# Patient Record
Sex: MALE | Race: WHITE | Employment: FULL TIME | URBAN - METROPOLITAN AREA
[De-identification: names, ages, dates, MRNs, and addresses within clinical notes are randomized per-mention and may not be internally consistent; named-entity substitution may affect disease eponyms.]

---

## 2017-12-08 ENCOUNTER — HOSPITAL ENCOUNTER (OUTPATIENT)
Dept: LAB | Age: 52
Discharge: HOME | End: 2017-12-08
Payer: COMMERCIAL

## 2017-12-08 DIAGNOSIS — I10 HYPERTENSION, UNSPECIFIED TYPE: ICD-10-CM

## 2017-12-08 LAB — SODIUM UR-SCNC: 101 MMOL/L

## 2017-12-08 PROCEDURE — 84300 ASSAY OF URINE SODIUM: CPT | Performed by: INTERNAL MEDICINE

## 2019-08-14 ENCOUNTER — HOSPITAL ENCOUNTER (OUTPATIENT)
Dept: LAB | Age: 54
Discharge: HOME | End: 2019-08-14

## 2019-08-14 PROCEDURE — 88305 TISSUE EXAM BY PATHOLOGIST: CPT

## 2021-01-14 ENCOUNTER — ANESTHESIA EVENT (OUTPATIENT)
Dept: SURGERY | Age: 56
End: 2021-01-14
Payer: COMMERCIAL

## 2021-01-15 ENCOUNTER — ANESTHESIA (OUTPATIENT)
Dept: SURGERY | Age: 56
End: 2021-01-15
Payer: COMMERCIAL

## 2021-01-15 ENCOUNTER — HOSPITAL ENCOUNTER (OUTPATIENT)
Age: 56
Setting detail: OUTPATIENT SURGERY
Discharge: HOME | End: 2021-01-15
Attending: ORTHOPAEDIC SURGERY | Admitting: ORTHOPAEDIC SURGERY
Payer: COMMERCIAL

## 2021-01-15 VITALS
DIASTOLIC BLOOD PRESSURE: 76 MMHG | SYSTOLIC BLOOD PRESSURE: 137 MMHG | HEART RATE: 87 BPM | OXYGEN SATURATION: 95 % | TEMPERATURE: 97.6 F | WEIGHT: 190 LBS | BODY MASS INDEX: 25.77 KG/M2 | RESPIRATION RATE: 15 BRPM

## 2021-01-15 LAB — POTASSIUM BLD-SCNC: 3 MMOL/L (ref 3.5–5.1)

## 2021-01-15 PROCEDURE — 77030010509 HC AIRWY LMA MSK TELE -A: Performed by: ANESTHESIOLOGY

## 2021-01-15 PROCEDURE — 77030003666 HC NDL SPINAL BD -A: Performed by: ORTHOPAEDIC SURGERY

## 2021-01-15 PROCEDURE — 2709999900 HC NON-CHARGEABLE SUPPLY: Performed by: ORTHOPAEDIC SURGERY

## 2021-01-15 PROCEDURE — A4565 SLINGS: HCPCS | Performed by: ORTHOPAEDIC SURGERY

## 2021-01-15 PROCEDURE — 74011250636 HC RX REV CODE- 250/636: Performed by: ANESTHESIOLOGY

## 2021-01-15 PROCEDURE — 77030019605: Performed by: ORTHOPAEDIC SURGERY

## 2021-01-15 PROCEDURE — 77030006891 HC BLD SHV RESECT STRY -B: Performed by: ORTHOPAEDIC SURGERY

## 2021-01-15 PROCEDURE — 76210000063 HC OR PH I REC FIRST 0.5 HR: Performed by: ORTHOPAEDIC SURGERY

## 2021-01-15 PROCEDURE — 74011000250 HC RX REV CODE- 250: Performed by: NURSE ANESTHETIST, CERTIFIED REGISTERED

## 2021-01-15 PROCEDURE — 74011250636 HC RX REV CODE- 250/636: Performed by: NURSE ANESTHETIST, CERTIFIED REGISTERED

## 2021-01-15 PROCEDURE — 77030040361 HC SLV COMPR DVT MDII -B: Performed by: ORTHOPAEDIC SURGERY

## 2021-01-15 PROCEDURE — 77030004453 HC BUR SHV STRY -B: Performed by: ORTHOPAEDIC SURGERY

## 2021-01-15 PROCEDURE — 74011000250 HC RX REV CODE- 250: Performed by: ANESTHESIOLOGY

## 2021-01-15 PROCEDURE — 76060000033 HC ANESTHESIA 1 TO 1.5 HR: Performed by: ORTHOPAEDIC SURGERY

## 2021-01-15 PROCEDURE — 77030011930 HC WND ARTHRO ABLT S&N -C: Performed by: ORTHOPAEDIC SURGERY

## 2021-01-15 PROCEDURE — 76010010054 HC POST OP PAIN BLOCK: Performed by: ORTHOPAEDIC SURGERY

## 2021-01-15 PROCEDURE — 74011250636 HC RX REV CODE- 250/636: Performed by: ORTHOPAEDIC SURGERY

## 2021-01-15 PROCEDURE — 77030042357 HC MANFLD KT -B: Performed by: ORTHOPAEDIC SURGERY

## 2021-01-15 PROCEDURE — 77030002933 HC SUT MCRYL J&J -A: Performed by: ORTHOPAEDIC SURGERY

## 2021-01-15 PROCEDURE — 76010000161 HC OR TIME 1 TO 1.5 HR INTENSV-TIER 1: Performed by: ORTHOPAEDIC SURGERY

## 2021-01-15 PROCEDURE — 77030003602 HC NDL NRV BLK BBMI -B: Performed by: ANESTHESIOLOGY

## 2021-01-15 PROCEDURE — 76942 ECHO GUIDE FOR BIOPSY: CPT | Performed by: ORTHOPAEDIC SURGERY

## 2021-01-15 PROCEDURE — 76210000021 HC REC RM PH II 0.5 TO 1 HR: Performed by: ORTHOPAEDIC SURGERY

## 2021-01-15 PROCEDURE — 77030039821 HC DEV SUT FIRSTPASS MIN S&N -G1: Performed by: ORTHOPAEDIC SURGERY

## 2021-01-15 PROCEDURE — 84132 ASSAY OF SERUM POTASSIUM: CPT

## 2021-01-15 RX ORDER — OXYCODONE HYDROCHLORIDE 5 MG/1
5 TABLET ORAL
Status: DISCONTINUED | OUTPATIENT
Start: 2021-01-15 | End: 2021-01-15 | Stop reason: HOSPADM

## 2021-01-15 RX ORDER — FENTANYL CITRATE 50 UG/ML
100 INJECTION, SOLUTION INTRAMUSCULAR; INTRAVENOUS ONCE
Status: COMPLETED | OUTPATIENT
Start: 2021-01-15 | End: 2021-01-15

## 2021-01-15 RX ORDER — EPHEDRINE SULFATE/0.9% NACL/PF 50 MG/5 ML
SYRINGE (ML) INTRAVENOUS AS NEEDED
Status: DISCONTINUED | OUTPATIENT
Start: 2021-01-15 | End: 2021-01-15 | Stop reason: HOSPADM

## 2021-01-15 RX ORDER — PROPOFOL 10 MG/ML
INJECTION, EMULSION INTRAVENOUS AS NEEDED
Status: DISCONTINUED | OUTPATIENT
Start: 2021-01-15 | End: 2021-01-15 | Stop reason: HOSPADM

## 2021-01-15 RX ORDER — HYDROMORPHONE HYDROCHLORIDE 2 MG/ML
0.5 INJECTION, SOLUTION INTRAMUSCULAR; INTRAVENOUS; SUBCUTANEOUS
Status: DISCONTINUED | OUTPATIENT
Start: 2021-01-15 | End: 2021-01-15 | Stop reason: HOSPADM

## 2021-01-15 RX ORDER — SODIUM CHLORIDE 0.9 % (FLUSH) 0.9 %
5-40 SYRINGE (ML) INJECTION AS NEEDED
Status: DISCONTINUED | OUTPATIENT
Start: 2021-01-15 | End: 2021-01-15 | Stop reason: HOSPADM

## 2021-01-15 RX ORDER — LIDOCAINE HYDROCHLORIDE 20 MG/ML
INJECTION, SOLUTION EPIDURAL; INFILTRATION; INTRACAUDAL; PERINEURAL AS NEEDED
Status: DISCONTINUED | OUTPATIENT
Start: 2021-01-15 | End: 2021-01-15 | Stop reason: HOSPADM

## 2021-01-15 RX ORDER — MIDAZOLAM HYDROCHLORIDE 1 MG/ML
2 INJECTION, SOLUTION INTRAMUSCULAR; INTRAVENOUS
Status: DISCONTINUED | OUTPATIENT
Start: 2021-01-15 | End: 2021-01-15 | Stop reason: HOSPADM

## 2021-01-15 RX ORDER — CEFAZOLIN SODIUM/WATER 2 G/20 ML
2 SYRINGE (ML) INTRAVENOUS ONCE
Status: COMPLETED | OUTPATIENT
Start: 2021-01-15 | End: 2021-01-15

## 2021-01-15 RX ORDER — SODIUM CHLORIDE, SODIUM LACTATE, POTASSIUM CHLORIDE, CALCIUM CHLORIDE 600; 310; 30; 20 MG/100ML; MG/100ML; MG/100ML; MG/100ML
100 INJECTION, SOLUTION INTRAVENOUS CONTINUOUS
Status: DISCONTINUED | OUTPATIENT
Start: 2021-01-15 | End: 2021-01-15 | Stop reason: HOSPADM

## 2021-01-15 RX ORDER — LIDOCAINE HYDROCHLORIDE 10 MG/ML
0.1 INJECTION INFILTRATION; PERINEURAL AS NEEDED
Status: DISCONTINUED | OUTPATIENT
Start: 2021-01-15 | End: 2021-01-15 | Stop reason: HOSPADM

## 2021-01-15 RX ORDER — ONDANSETRON 2 MG/ML
INJECTION INTRAMUSCULAR; INTRAVENOUS AS NEEDED
Status: DISCONTINUED | OUTPATIENT
Start: 2021-01-15 | End: 2021-01-15 | Stop reason: HOSPADM

## 2021-01-15 RX ORDER — NALOXONE HYDROCHLORIDE 0.4 MG/ML
0.04 INJECTION, SOLUTION INTRAMUSCULAR; INTRAVENOUS; SUBCUTANEOUS
Status: DISCONTINUED | OUTPATIENT
Start: 2021-01-15 | End: 2021-01-15 | Stop reason: HOSPADM

## 2021-01-15 RX ORDER — EPINEPHRINE 1 MG/ML
INJECTION, SOLUTION, CONCENTRATE INTRAVENOUS AS NEEDED
Status: DISCONTINUED | OUTPATIENT
Start: 2021-01-15 | End: 2021-01-15 | Stop reason: HOSPADM

## 2021-01-15 RX ORDER — MIDAZOLAM HYDROCHLORIDE 1 MG/ML
2 INJECTION, SOLUTION INTRAMUSCULAR; INTRAVENOUS ONCE
Status: COMPLETED | OUTPATIENT
Start: 2021-01-15 | End: 2021-01-15

## 2021-01-15 RX ORDER — SODIUM CHLORIDE 0.9 % (FLUSH) 0.9 %
5-40 SYRINGE (ML) INJECTION EVERY 8 HOURS
Status: DISCONTINUED | OUTPATIENT
Start: 2021-01-15 | End: 2021-01-15 | Stop reason: HOSPADM

## 2021-01-15 RX ORDER — DEXAMETHASONE SODIUM PHOSPHATE 4 MG/ML
INJECTION, SOLUTION INTRA-ARTICULAR; INTRALESIONAL; INTRAMUSCULAR; INTRAVENOUS; SOFT TISSUE AS NEEDED
Status: DISCONTINUED | OUTPATIENT
Start: 2021-01-15 | End: 2021-01-15 | Stop reason: HOSPADM

## 2021-01-15 RX ADMIN — SODIUM CHLORIDE, SODIUM LACTATE, POTASSIUM CHLORIDE, AND CALCIUM CHLORIDE 100 ML/HR: 600; 310; 30; 20 INJECTION, SOLUTION INTRAVENOUS at 08:22

## 2021-01-15 RX ADMIN — Medication 10 MG: at 09:18

## 2021-01-15 RX ADMIN — PROPOFOL 200 MG: 10 INJECTION, EMULSION INTRAVENOUS at 09:14

## 2021-01-15 RX ADMIN — Medication 2 G: at 09:05

## 2021-01-15 RX ADMIN — ROPIVACAINE HYDROCHLORIDE 35 ML: 5 INJECTION, SOLUTION EPIDURAL; INFILTRATION; PERINEURAL at 08:36

## 2021-01-15 RX ADMIN — MIDAZOLAM 2 MG: 1 INJECTION INTRAMUSCULAR; INTRAVENOUS at 08:33

## 2021-01-15 RX ADMIN — LIDOCAINE HYDROCHLORIDE 100 MG: 20 INJECTION, SOLUTION EPIDURAL; INFILTRATION; INTRACAUDAL; PERINEURAL at 09:14

## 2021-01-15 RX ADMIN — Medication 10 MG: at 09:20

## 2021-01-15 RX ADMIN — ROPIVACAINE HYDROCHLORIDE 35 ML: 5 INJECTION, SOLUTION EPIDURAL; INFILTRATION; PERINEURAL at 09:47

## 2021-01-15 RX ADMIN — FENTANYL CITRATE 100 MCG: 50 INJECTION, SOLUTION INTRAMUSCULAR; INTRAVENOUS at 08:33

## 2021-01-15 RX ADMIN — Medication 10 MG: at 09:34

## 2021-01-15 RX ADMIN — ONDANSETRON 4 MG: 2 INJECTION INTRAMUSCULAR; INTRAVENOUS at 09:55

## 2021-01-15 RX ADMIN — DEXAMETHASONE SODIUM PHOSPHATE 4 MG: 4 INJECTION, SOLUTION INTRAMUSCULAR; INTRAVENOUS at 09:53

## 2021-01-15 NOTE — DISCHARGE INSTRUCTIONS
Post-Operative Instructions   For  Shoulder Arthroscopy  Phone:  [phone]    1. Apply ice to the shoulder as needed. 2. You may shower after the arthroscopy. Keep dressings in place for the first three days and do not get them wet. After three days, you may remove the dressings and leave the steri-strip bandages in place; they will peel off naturally. 3. You may discontinue use of your sling and begin active range of motion of the elbow as tolerated by pain, unless you are instructed otherwise. Do not lift arm by your side for 4 weeks. 4. Begin therapy as ordered. 5. Use any pain medication as instructed. You should take your pain medication as soon as you feel the anesthetic wearing off. Do not wait until you are in severe pain to begin taking your pain medication. 6. You may have some side effects from your pain medication. If you have nausea, try taking your medication with food. For itching, you may take over the counter Benadryl. 7. You may have been given a prescription for Phenergan. This medication is used for nausea and vomiting. You do not need to get this prescription filled unless you have a problem. 8. If you have a problem, please call [address] [place] [address] at [phone] [address] [address]         [address] [place] [address] Call your doctor if pain is NOT relieved by medication.   Excessive bleeding that does not stop after holding pressure over the area  · Temperature of 101 degrees F or above  · Excessive redness, swelling or bruising, and/ or green or yellow, smelly discharge from incision      TYPICAL SIDE EFFECTS OF PAIN MEDICATION:     Constipation: Drink lots of fluids. Over the counter stool softener if needed.    Nausea: Take pain medication with food. Call your doctor with persistent nausea. ACTIVITY  · As tolerated and as directed by your doctor. · Bathe or shower as directed by your doctor. DIET  · Day of surgery: Clear liquids until no nausea or vomiting; small portion, light diet [place] foods (ex: baked chicken, plain rice, grits, scrambled eggs, toast). Nothing greasy, fried or spicy today. · Advance to regular diet on second day, unless your doctor orders otherwise. · If nausea and vomiting continues, call your doctor. PAIN  · Take pain medication as directed by your doctor. · DO NOT take aspirin or blood thinners unless directed by your doctor. AFTER ANESTHESIA   · For the first 24 hours: DO NOT Drive, Drink alcoholic beverages, or Make important decisions. · Be aware of dizziness following anesthesia and while taking pain medication. DISCHARGE SUMMARY from Nurse    PATIENT INSTRUCTIONS:    After general anesthesia or intravenous sedation, for 24 hours or while taking prescription Narcotics:  · Limit your activities  · Do not drive and operate hazardous machinery  · Do not make important personal or business decisions  · Do  not drink alcoholic beverages  · If you have not urinated within 8 hours after discharge, please contact your surgeon on call. *  Please give a list of your current medications to your Primary Care Provider. *  Please update this list whenever your medications are discontinued, doses are      changed, or new medications (including over-the-counter products) are added. *  Please carry medication information at all times in case of emergency situations. Preventing Infection at Home  We care about preventing infection and avoiding the spread of germs - not only when you are in the hospital but also when you return home. When you return home from the hospital, its important to take the following steps to help prevent infection and avoid spreading germs that could infect you and others. Ask everyone in your home to follow these guidelines, too.     Clean Your Hands  · Clean your hands whenever your hands are visibly dirty, before you eat, before or after touching your mouth, nose or eyes, and before preparing food. Clean them after contact with body fluids, using the restroom, touching animals or changing diapers. · When washing hands, wet them with warm water and work up a lather. Rub hands for at least 15 seconds, then rinse them and pat them dry with a clean towel or paper towel. · When using hand sanitizers, it should take about 15 seconds to rub your hands dry. If not, you probably didnt apply enough . Cover Your Sneeze or Cough  Germs are released into the air whenever you sneeze or cough. To prevent the spread of infection:  · Turn away from other people before coughing or sneezing. · Cover your mouth or nose with a tissue when you cough or sneeze. Put the tissue in the trash. · If you dont have a tissue, cough or sneeze into your upper sleeve, not your hands. · Always clean your hands after coughing or sneezing. Care for Wounds  Your skin is your bodys first line of defense against germs, but an open wound leaves an easy way for germs to enter your body. To prevent infection:  · Clean your hands before and after changing wound dressings, and wear gloves to change dressings if recommended by your doctor. · Take special care with IV lines or other devices inserted into the body. If you must touch them, clean your hands first.  · Follow any specific instructions from your doctor to care for your wounds. Contact your doctor if you experience any signs of infection, such as fever or increased redness at the surgical or wound site. Keep a Clean Home  · Clean or wipe commonly touched hard surfaces like door handles, sinks, tabletops, phones and TV remotes. · Use products labeled disinfectant to kill harmful bacteria and viruses. · Use a clean cloth or paper towel to clean and dry surfaces. Wiping surfaces with a dirty dishcloth, sponge or towel will only spread germs.   · Never share toothbrushes, danielson, drinking glasses, utensils, razor blades, face cloths or bath towels to avoid spreading germs. · Be sure that the linens that you sleep on are clean. · Keep pets away from wounds and wash your hands after touching pets, their toys or bedding. We care about you and your health. Remember, preventing infections is a team effort between you, your family, friends and health care providers. These are general instructions for a healthy lifestyle:    No smoking/ No tobacco products/ Avoid exposure to second hand smoke    Surgeon General's Warning:  Quitting smoking now greatly reduces serious risk to your health. Obesity, smoking, and sedentary lifestyle greatly increases your risk for illness    A healthy diet, regular physical exercise & weight monitoring are important for maintaining a healthy lifestyle    You may be retaining fluid if you have a history of heart failure or if you experience any of the following symptoms:  Weight gain of 3 pounds or more overnight or 5 pounds in a week, increased swelling in our hands or feet or shortness of breath while lying flat in bed. Please call your doctor as soon as you notice any of these symptoms; do not wait until your next office visit. Recognize signs and symptoms of STROKE:    F-face looks uneven    A-arms unable to move or move unevenly    S-speech slurred or non-existent    T-time-call 911 as soon as signs and symptoms begin-DO NOT go       Back to bed or wait to see if you get better-TIME IS BRAIN. Advance Care Planning  People with COVID-19 may have no symptoms, mild symptoms, such as fever, cough, and shortness of breath or they may have more severe illness, developing severe and fatal pneumonia.   As a result, Advance Care Planning with attention to naming a health care decision maker (someone you trust to make healthcare decisions for you if you could not speak for yourself) and sharing other health care preferences is important BEFORE a possible health crisis. Please contact your Primary Care Provider to discuss Advance Care Planning. Preventing the Spread of Coronavirus Disease 2019 in Homes and Residential Communities  For the most recent information go to [email or web address]    Prevention steps for People with confirmed or suspected COVID-19 (including persons under investigation) who do not need to be hospitalized  and   People with confirmed COVID-19 who were hospitalized and determined to be medically stable to go home    Your healthcare provider and public health staff will evaluate whether you can be cared for at home. If it is determined that you do not need to be hospitalized and can be isolated at home, you will be monitored by staff from your local or state health department. You should follow the prevention steps below until a healthcare provider or local or state health department says you can return to your normal activities. Stay home except to get medical care  People who are mildly ill with COVID-19 are able to isolate at home during their illness. You should restrict activities outside your home, except for getting medical care. Do not go to work, school, or public areas. Avoid using public transportation, ride-sharing, or taxis. Separate yourself from other people and animals in your home  People: As much as possible, you should stay in a specific room and away from other people in your home. Also, you should use a separate bathroom, if available. Animals: You should restrict contact with pets and other animals while you are sick with COVID-19, just like you would around other people. Although there have not been reports of pets or other animals becoming sick with COVID-19, it is still recommended that people sick with COVID-19 limit contact with animals until more information is known about the virus.  When possible, have another member of your household care for your animals while you are sick. If you are sick with COVID-19, avoid contact with your pet, including petting, snuggling, being kissed or licked, and sharing food. If you must care for your pet or be around animals while you are sick, wash your hands before and after you interact with pets and wear a facemask. Call ahead before visiting your doctor  If you have a medical appointment, call the healthcare provider and tell them that you have or may have COVID-19. This will help the healthcare providers office take steps to keep other people from getting infected or exposed. Wear a facemask  You should wear a facemask when you are around other people (e.g., sharing a room or vehicle) or pets and before you enter a healthcare providers office. If you are not able to wear a facemask (for example, because it causes trouble breathing), then people who live with you should not stay in the same room with you, or they should wear a facemask if they enter your room. Cover your coughs and sneezes  Cover your mouth and nose with a tissue when you cough or sneeze. Throw used tissues in a lined trash can. Immediately wash your hands with soap and water for at least 20 seconds or, if soap and water are not available, clean your hands with an alcohol-based hand  that contains at least 60% alcohol. Clean your hands often  Wash your hands often with soap and water for at least 20 seconds, especially after blowing your nose, coughing, or sneezing; going to the bathroom; and before eating or preparing food. If soap and water are not readily available, use an alcohol-based hand  with at least 60% alcohol, covering all surfaces of your hands and rubbing them together until they feel dry. Soap and water are the best option if hands are visibly dirty. Avoid touching your eyes, nose, and mouth with unwashed hands.   Avoid sharing personal household items  You should not share dishes, drinking glasses, cups, eating utensils, towels, or bedding with other people or pets in your home. After using these items, they should be washed thoroughly with soap and water. Clean all high-touch surfaces everyday  High touch surfaces include counters, tabletops, doorknobs, bathroom fixtures, toilets, phones, keyboards, tablets, and bedside tables. Also, clean any surfaces that may have blood, stool, or body fluids on them. Use a household cleaning spray or wipe, according to the label instructions. Labels contain instructions for safe and effective use of the cleaning product including precautions you should take when applying the product, such as wearing gloves and making sure you have good ventilation during use of the product. Monitor your symptoms  Seek prompt medical attention if your illness is worsening (e.g., difficulty breathing). Before seeking care, call your healthcare provider and tell them that you have, or are being evaluated for, COVID-19. Put on a facemask before you enter the facility. These steps will help the healthcare providers office to keep other people in the office or waiting room from getting infected or exposed. Ask your healthcare provider to call the local or [facility] health department. Persons who are placed under active monitoring or facilitated self-monitoring should follow instructions provided by their local health department or occupational health professionals, as appropriate. When working with your local health department check their available hours. If you have a medical emergency and need to call 911, notify the dispatch personnel that you have, or are being evaluated for COVID-19. If possible, put on a facemask before emergency medical services arrive. Discontinuing home isolation  Patients with confirmed COVID-19 should remain under home isolation precautions until the risk of secondary transmission to others is thought to be low.  The decision to discontinue home isolation precautions should be made on a case-by-case basis, in consultation with healthcare providers and state and local health departments.

## 2021-01-15 NOTE — ANESTHESIA PREPROCEDURE EVALUATION
Relevant Problems   No relevant active problems       Anesthetic History   No history of anesthetic complications            Review of Systems / Medical History  Pertinent labs reviewed    Pulmonary        Sleep apnea: CPAP           Neuro/Psych             Comments: hereditary sensory neuropathy- last flare summer 2020. Pt gets numbness or weakness that migrates from face, arms, and legs bilaterally.   Treated with rest. Cardiovascular    Hypertension              Exercise tolerance: >4 METS     GI/Hepatic/Renal  Within defined limits              Endo/Other        Arthritis and anemia (pernicious anemia)     Other Findings            Physical Exam    Airway  Mallampati: II  TM Distance: 4 - 6 cm  Neck ROM: normal range of motion   Mouth opening: Normal     Cardiovascular  Regular rate and rhythm,  S1 and S2 normal,  no murmur, click, rub, or gallop             Dental  No notable dental hx       Pulmonary  Breath sounds clear to auscultation               Abdominal  GI exam deferred       Other Findings            Anesthetic Plan    ASA: 2  Anesthesia type: general      Post-op pain plan if not by surgeon: peripheral nerve block single    Induction: Intravenous  Anesthetic plan and risks discussed with: Patient and Spouse

## 2021-01-15 NOTE — H&P
Outpatient Surgery History and Physical:  Jeanine Courser was seen and examined. CHIEF COMPLAINT:   LT sshoulder. PE:   There were no vitals taken for this visit. Heart:   Regular rhythm      Lungs:  Are clear      Past Medical History:    Patient Active Problem List    Diagnosis    Hereditary sensory neuropathy     [facility]      Arthritis     O. A.      Asthma    Other ill-defined conditions(699.89)     pernicious anemia-b12 shots once momthly      Lower leg DVT (deep venous thrombosis) (HCC)    Pernicious anemia       Surgical History:   Past Surgical History:   Procedure Laterality Date    HX LIPOMA RESECTION      HX ORTHOPAEDIC      knee artho.  [place] ABDOMEN SURGERY PROC UNLISTED      [facility]       Social History: Patient  reports that he has never smoked. He has never used smokeless tobacco. He reports current alcohol use. He reports that he does not use drugs. Family History:   Family History   Problem Relation Age of Onset    Hypertension Mother     Cancer Mother         kidney    Diabetes Mother     Heart Disease Mother     Seizures Mother     Heart Attack Mother         x7-8+    Cancer Father     Heart Disease Father     Heart Attack Father     Cancer Paternal Uncle        Allergies: Reviewed per EMR  No Known Allergies    Medications:    No current facility-administered medications on file prior to encounter. Current Outpatient Medications on File Prior to Encounter   Medication Sig    sildenafil citrate (VIAGRA) 25 mg tablet Take 1 Tab by mouth as needed. The surgery is planned for the shoulder. History and physical has been reviewed. The patient has been examined. There have been no significant clinical changes since the completion of the originally dated History and Physical.  Patient identified by surgeon; surgical site was confirmed by patient and surgeon. The patient is here today for outpatient surgery.  I have examined the patient, no changes are noted in the patient's medical status. Necessity for the procedure/care is still present and the history and physical above is current. See the office notes for the full long term history of the problem. Please see the recent office notes for the musculoskeletal examination. Signed By: Raymond Wills MD     January 15, 2021 7:00 AM       Outpatient Surgery History and Physical:  Brian Dugan was seen and examined. CHIEF COMPLAINT:   shulder. PE:   There were no vitals taken for this visit. Heart:   Regular rhythm      Lungs:  Are clear      Past Medical History:    Patient Active Problem List    Diagnosis    Hereditary sensory neuropathy     [facility]      Arthritis     O. A.      Asthma    Other ill-defined conditions(799.89)     pernicious anemia-b12 shots once momthly      Lower leg DVT (deep venous thrombosis) (HCC)    Pernicious anemia       Surgical History:   Past Surgical History:   Procedure Laterality Date    HX LIPOMA RESECTION      HX ORTHOPAEDIC      knee artho.  [place] ABDOMEN SURGERY PROC UNLISTED      [facility]       Social History: Patient  reports that he has never smoked. He has never used smokeless tobacco. He reports current alcohol use. He reports that he does not use drugs. Family History:   Family History   Problem Relation Age of Onset    Hypertension Mother     Cancer Mother         kidney    Diabetes Mother     Heart Disease Mother     Seizures Mother     Heart Attack Mother         x7-8+    Cancer Father     Heart Disease Father     Heart Attack Father     Cancer Paternal Uncle        Allergies: Reviewed per EMR  No Known Allergies    Medications:    No current facility-administered medications on file prior to encounter. Current Outpatient Medications on File Prior to Encounter   Medication Sig    sildenafil citrate (VIAGRA) 25 mg tablet Take 1 Tab by mouth as needed. The surgery is planned for the shoulder.         History and physical has been reviewed. The patient has been examined. There have been no significant clinical changes since the completion of the originally dated History and Physical.  Patient identified by surgeon; surgical site was confirmed by patient and surgeon. The patient is here today for outpatient surgery. I have examined the patient, no changes are noted in the patient's medical status. Necessity for the procedure/care is still present and the history and physical above is current. See the office notes for the full long term history of the problem. Please see the recent office notes for the musculoskeletal examination.     Signed By: Kerri Mac MD     January 15, 2021 7:00 AM       History and Physical

## 2021-01-15 NOTE — PERIOP NOTES
PACU DISCHARGE NOTE  Vital signs stable, pain well controlled, alert and oriented times three or at baseline, no anesthetic complications. IV removed with catheter tip intact. Written and verbal discharge instructions given, including pain control, dressing care and follow up appointment. Spouse, [facility], verbalized understanding and signed discharge instructions. All questions answered prior to discharge. Dr Geo Botello okay to discharge at this time. Pt and all belongings taken via wheelchair and safely put in vehicle.

## 2021-01-15 NOTE — ANESTHESIA PROCEDURE NOTES
Peripheral Block    Start time: 1/15/2021 9:43 AM  End time: 1/15/2021 9:47 AM  Performed by: Macie Tyler MD  Authorized by: Macie Tyler MD       Pre-procedure: Indications: at surgeon's request, post-op pain management and procedure for pain    Preanesthetic Checklist: patient identified, risks and benefits discussed, site marked, timeout performed, anesthesia consent given and patient being monitored    Timeout Time: 09:43  Preanesthetic Checklist comment:  Risk of nerve damage discussed. Block Type:   Block Type: Interscalene  Laterality:  Left  Monitoring:  Standard ASA monitoring, continuous pulse ox, frequent vital sign checks, heart rate, oxygen and responsive to questions  Injection Technique:  Single shot  Procedures: ultrasound guided and nerve stimulator    Prep: chlorhexidine    Needle Type:  Stimuplex (4 Inch)  Needle Gauge:  20 G  Needle Localization:  Ultrasound guidance and nerve stimulator  Motor Response comment:   Motor Response: minimal motor response >0.4 mA   Medication Injected:  Ropivacaine 0.375% with epi 1:200,000 in NS injection, 35 mL (Mixture components: ropivacaine (PF) 5 mg/mL (0.5 %) Soln, . 75 mL; EPINEPHrine HCl (PF) 1 mg/mL (1 mL) Soln, . 005 mL; 0.9% sodium chloride Soln, .245 mL)  Med Admin Time: 1/15/2021 9:47 AM    Assessment:  Number of attempts:  1  Injection Assessment:  Incremental injection every 5 mL, local visualized surrounding nerve on ultrasound, negative aspiration for blood, no paresthesia, no intravascular symptoms and ultrasound image on chart  Patient tolerance:  Patient tolerated the procedure well with no immediate complications  3 cc 1% lidocaine injected at site of needle insertion. Needle inserted and placed in close proximity to the nerve under real time ultrasound guidance. Ultrasound was used to visualize the spread of local anesthetic in close proximity to the nerve being blocked.   The nerve appeared anatomically normal and there were no abnormal findings.

## 2021-01-15 NOTE — OP NOTES
[facility]  OPERATIVE REPORT    Name:  Bertrand Shelton  MR#:  [id number]  :  1965  ACCOUNT #:  [de-identified]  DATE OF SERVICE:  01/15/2021    PREOPERATIVE DIAGNOSIS:  Left possible biceps tear. POSTOPERATIVE DIAGNOSES:  1. Biceps tear and superior labral tear of the left shoulder. 2.  Marked impingement of the left shoulder. 3.  Acromioclavicular joint arthritis. PROCEDURE PERFORMED:  1. Arthroscopic resection of the distal clavicle, 78490.  2.  Biceps tenotomy, 30193.  3.  Subacromial decompression, 62635. SURGEON:  Loan Lopez MD    ASSISTANT:  None. ANESTHESIA:  General.    COMPLICATIONS:  None. SPECIMENS REMOVED:  None. IMPLANTS:  None. ESTIMATED BLOOD LOSS:  Minimal.    PROCEDURE:  After an adequate level of general anesthesia was obtained, the patient had a block per Anesthesia for postop pain management and received antibiotics. He had full range of motion of the shoulder. I discussed with him in the office and the preop area the possibility of a biceps tenotomy and the sequelae. The joint was distended posteriorly with saline and the arthroscope introduced. Anterior portal was made at the soft spot. The articular surface looked good. The biceps was torn when there was marked separation of the labrum with a superior labral tear as well and a split in the biceps at the anchor. It was elected to perform a biceps tenotomy through the anterior portal and this was performed with the shaver, basket and radiofrequency. The undersurface of the rotator cuff was well visualized and he had some fraying but still good integrity. The arthroscope was then placed in the subacromial space. The patient had very marked hooking of the acromion and a decompression was performed with the shaver and the frankie removing the anterior and lateral osteophytes. He also had degenerative changes of the [facility] joint. The rotator cuff was well visualized.   It was visualize through the posterior portal and through the lateral portal.  It was probed extensively. He still had good integrity. There was some mild thinning but it still had good integrity. It was not felt that he had enough of a tear to convert to a full-thickness tear and a repair. It was debrided with the shaver. Circumferentially, the soft tissue was removed around the distal clavicle and some large osteophytes inferiorly which were removed through the lateral portal with a frankie. Then through the anterior portal, more of the superior bone was removed at the end of the distal clavicle on both sides of the joint. This provided ample room. The wound was then copiously irrigated. Steri-Strips and sterile dressings were applied. The arm was placed in a shoulder immobilizer. He tolerated the procedure well.       MD IKER [id number]  D:  01/15/2021 10:20  T:  01/15/2021 11:01  JOB #:  [id number]  CC:  [id number] [facility]

## 2021-01-15 NOTE — ANESTHESIA POSTPROCEDURE EVALUATION
Procedure(s):  LEFT SHOULDER ARTHROSCOPY DISTAL CLAVICLE RESECTION/ DECOMPRESSION/ BICEPS TENOTOMY. general    Anesthesia Post Evaluation        Patient location during evaluation: PACU  Patient participation: complete - patient participated  Level of consciousness: awake  Pain management: satisfactory to patient  Airway patency: patent  Anesthetic complications: no  Cardiovascular status: hemodynamically stable  Respiratory status: spontaneous ventilation  Hydration status: euvolemic  Post anesthesia nausea and vomiting:  none      INITIAL Post-op Vital signs:   Vitals Value Taken Time   /71 01/15/21 1045   Temp 36.3 °C (97.4 °F) 01/15/21 1032   Pulse 94 01/15/21 1047   Resp 15 01/15/21 1045   SpO2 96 % 01/15/21 1047   Vitals shown include unvalidated device data.

## 2021-01-15 NOTE — ANESTHESIA PROCEDURE NOTES
Peripheral Block    Start time: 1/15/2021 8:32 AM  End time: 1/15/2021 8:36 AM  Performed by: Rolly Eid MD  Authorized by: Rolly Eid MD       Pre-procedure: Indications: at surgeon's request, post-op pain management and procedure for pain    Preanesthetic Checklist: patient identified, risks and benefits discussed, site marked, timeout performed, anesthesia consent given and patient being monitored    Timeout Time: 08:32  Preanesthetic Checklist comment:  Risk of nerve damage discussed. Block Type:   Block Type: Interscalene  Laterality:  Left  Monitoring:  Standard ASA monitoring, continuous pulse ox, frequent vital sign checks, heart rate, oxygen and responsive to questions  Injection Technique:  Single shot  Procedures: ultrasound guided and nerve stimulator    Prep: chlorhexidine    Needle Type:  Stimuplex (4 Inch)  Needle Gauge:  20 G  Needle Localization:  Ultrasound guidance and nerve stimulator  Motor Response comment:   Motor Response: minimal motor response >0.4 mA   Medication Injected:  Ropivacaine 0.375% with epi 1:200,000 in NS injection, 35 mL (Mixture components: ropivacaine (PF) 5 mg/mL (0.5 %) Soln, . 75 mL; EPINEPHrine HCl (PF) 1 mg/mL (1 mL) Soln, . 005 mL; 0.9% sodium chloride Soln, .245 mL)  Med Admin Time: 1/15/2021 8:36 AM    Assessment:  Number of attempts:  1  Injection Assessment:  Incremental injection every 5 mL, local visualized surrounding nerve on ultrasound, negative aspiration for blood, no paresthesia, no intravascular symptoms and ultrasound image on chart  Patient tolerance:  Patient tolerated the procedure well with no immediate complications  3 cc 1% lidocaine injected at site of needle insertion. Needle inserted and placed in close proximity to the nerve under real time ultrasound guidance. Ultrasound was used to visualize the spread of local anesthetic in close proximity to the nerve being blocked.   The nerve appeared anatomically normal and there were no abnormal findings.

## 2021-01-19 NOTE — THERAPY EVALUATION
Rosanna Matter  : 1965      Payor: Soniya Otoole / Plan: [facility] / Product Type: PPO /    [facility] at [address] [place] [address]. Alverto Tavarez, [address] [place] [address]  [phone]   [phone]              OUTPATIENT PHYSICAL THERAPY:Initial Assessment: 2021    ICD-10: Treatment Diagnosis:   Pain in Left Shoulder (M25.512)  Stiffness of Left Shoulder not elsewhere specified (M25.612)  Primary Osteoarthritis of Left Shoulder (M19.012)  Strain of muscle(s) and tendon(s) of the rotator cuff of Left Shoulder, sequela (X81.050R)              Precautions/Allergies:   Patient has no known allergies. Fall Risk Score: 1 (? 5 = High Risk)  MD Orders: Eval and Treat  MEDICAL/REFERRING DIAGNOSIS:  degenerative joint disease of left acromioclavicular joint  subacromial inpingement of left shoulder  strain of muscle, fascia and tendon of other parts of biceps, left arm, initial encounder   DATE OF ONSET: 1.15.21  REFERRING PHYSICIAN: Gómez Mccray MD  RETURN PHYSICIAN APPOINTMENT: Wednesday. INITIAL ASSESSMENT:   Mr. Concha Ferrell presents to physical therapy with decreased strength, ROM, joint mobility, functional mobility. These S/S are consistent with S/P     1. Arthroscopic resection of the distal clavicle  2. Biceps tenotomy  3. Subacromial decompression    *He is very limited with ER and abduction actively as well as has increased pain with any AROM. Note: he is only 5 days post op at this time! ACTIVE ROM (standing) LEFT   Shoulder Flexion 130 deg   Shoulder Abduction 60 deg   Shoulder Internal Rotation (Apley's) L1    Shoulder External Rotation (Apley's) Top of head      Passively, he does demonstrate tightness with ER. Shown gentle isometrics first session.        Patient will benefit from skilled physical therapy for manual therapeutic techniques (as appropriate), therapeutic exercises and activities, neuromuscular re-education, and comprehensive home exercises program to address current impairments and functional limitations. Otglenn Matter will benefit from skilled PT (medically necessary) in order to address above deficits affecting participation in basic ADLs and overall functional tolerance. PROBLEM LIST (Impacting functional limitations):  · Decreased Strength  · Decreased ADL/Functional Activities  · Increased Pain  · Decreased Activity Tolerance  · Increased Shortness of Breath  · Decreased Flexibility/Joint Mobility  · Decreased [place] with Home Exercise Program INTERVENTIONS PLANNED:  1. Cold  2. Family Education  3. Home Exercise Program (HEP)  4. Manual Therapy  5. Neuromuscular Re-education/Strengthening  6. Range of Motion (ROM)  7. Therapeutic Activites  8. Therapeutic Exercise/Strengthening  9. Transfer Training  10. Ultrasound   TREATMENT PLAN:  Effective Dates: 1/20/2021 TO 4/19/2021 (90 days). Frequency/Duration: 2 times a week for 90 Days  GOALS: (Goals have been discussed and agreed upon with patient.)     Short-Term Goals~4 weeks  Goal Met   1. Otglenn Aceves will report <=1/10 pain with don/doffing clothing as well as minimal/no difficulty. 1.  [] Date:   2. Otsandhyala Matter will demonstrate improvement in active shoulder scaption/flexion to >155 degrees to increase UE function and participation in ADLs. 2.  [] Date:   3. Othella Matter will demonstrate demonstrate improvement in active shoulder abduction to >155 degrees to increase UE function and participation in ADLs. 3.  [] Date:   4. Othella Matter will show a greater than 8 point decrease on the DASH in order to show an increase in upper extremity function. 4.  [] Date:   5. Otglenn Aceves will be independent in all HEP 5.  [] Date:   6. Otglenn Matter will be able to reach backwards and put on seatbelt without difficulty. 6.  [] Date:         Long Term Goals~8 weeks Goal Met   1.  Otsandhyala Matter will show full ROM of the UE in order to return to full functional mobility  1. [] Date:   2. Savana Hardy will show a greater than 15 point decrease on the DASH in order to show an increase in upper extremity function 2. [] Date:   3. Savana Hardy will report doing hair/bathing without difficulty and <=2/10 pain in order to be independent with ADL's 3.  [] Date:   4. Savana Hardy will be independent in all advanced HEP 4.  [] Date:            Outcome Measure: Tool Used: Disabilities of the Arm, Shoulder and Hand (DASH) Questionnaire - Quick Version  Score:  Initial: 45/55  Most Recent: X/55 (Date: -- )   Interpretation of Score: The DASH is designed to measure the activities of daily living in person's with upper extremity dysfunction or pain. Each section is scored on a 1-5 scale, 5 representing the greatest disability. The scores of each section are added together for a total score of 55. Medical Necessity:   · Skilled intervention continues to be required due to deficits and impairments seen upon initial evaluation affecting patient's participation in ADLs and functional tasks. Reason for Services/Other Comments:  · Patient continues to require skilled intervention due to deficits and impairments seen upon initial evaluation affecting patient's participation in ADLs and functional tasks. Total Treatment Duration:  PT Patient Time In/Time Out  Time In: 1400  Time Out: 1500    Rehabilitation Potential For Stated Goals: good  Regarding Teto Gaston's therapy, I certify that the treatment plan above will be carried out by a therapist or under their direction. Thank you for this referral,  Poonam Victor DPT     Referring Physician Signature: Bibi Carreno MD              Date                    HISTORY:   History of Present Injury/Illness (Reason for Referral):  1.15.21 had surgery --- I did not have a brace, and don't need one. I am not working until [employer] better.   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 I started having pain in 2014 and I thought it was bursitis, but with ice and things it wouldn't get all the way better. Eventually this year I got to where I couldn't move it. I went and saw Ortho and he thought it was RTC but with MRI they found it was bicep.      -Present symptoms/complaints (on day of evaluation)  Pain Scale:  · Current: 2/10  · Best: 2/10  · Worst: 6/10    · Aggravating factors: Lifting, Carrying and Overhead activities  · Relieving factors: self medicating  · Irritability: Low (Onset of pain is is longer than the time it takes for Pain to go away)    Dominant Side:  left  Past Medical History/Comorbidities:   Mr. Rosanne Contreras  has a past medical history of Arthritis, Asthma, Hereditary sensory neuropathy, Hypertension, Lower leg DVT (deep venous thrombosis) ([facility] [address] (8/16/2009), Other ill-defined conditions(799.89), and Sleep apnea. He also has no past medical history of COPD or Unspecified adverse effect of anesthesia. Mr. Rosanne Contreras  has a past surgical history that includes pr abdomen surgery proc unlisted; hx lipoma resection; and hx orthopaedic. Social History/Living Environment:      Prior Level of Function/Work/Activity:  Normal  Other Clinical Tests:         X-RAY Negative for Fx  Current Medications:    Current Outpatient Medications:     amLODIPine (NORVASC) 10 mg tablet, TAKE 1 TABLET BY MOUTH EVERY DAY, Disp: , Rfl:     ALPRAZolam (Xanax) 0.5 mg tablet, Take  by mouth nightly as needed for Anxiety. , Disp: , Rfl:     chlorthalidone (HYGROTON) 25 mg tablet, TAKE 1 TABLET BY MOUTH EVERY MORNING WITH FOOD, Disp: , Rfl:     cholecalciferol, vitamin D3, 50 mcg (2,000 unit) tab, Take  by mouth., Disp: , Rfl:     ascorbic acid, vitamin C, (Vitamin C) 500 mg tablet, Take  by mouth., Disp: , Rfl:     multivitamin (ONE A DAY) tablet, Take 1 Tab by mouth daily. , Disp: , Rfl:         Ambulatory/Rehab Services H2 Model Falls Risk Assessment    Risk Factors:       (1)  Gender [Male] Ability to Rise from Chair:       (0)  Ability to rise in a single movement    Falls Prevention Plan:       No modifications necessary   Total: (5 or greater = High Risk): 1    ©2010 [facility] Patent #[id number]. Federal Law prohibits the replication, distribution or use without written permission from [facility] of [employer]       Date Last Reviewed:  1/20/2021   Number of Personal Factors/Comorbidities that affect the Plan of Care: 1-2: MODERATE COMPLEXITY   EXAMINATION:   Observation/Orthostatic Postural Assessment: Forward Head and Rounded Shoulders  Palpation:          Pain to anterior shoulder and incision sites.     ROM:    AROM/PROM         Joint: Eval Date: 1/20/2021  Re-Assess Date:  Re-Assess Date:    ACTIVE ROM (standing) RIGHT LEFT RIGHT LEFT RIGHT LEFT   Shoulder Flexion 180  130 deg           Shoulder Abduction 180 deg with twinge 60 deg           Shoulder Internal Rotation (Apley's) T5 L1            Shoulder External Rotation (Apley's) T3 Top of head            Elbow ROM             PASSIVE ROM (supine)             Shoulder Flexion  160 deg            Shoulder Abduction  153 deg            Shoulder Internal Rotation  70 deg           Shoulder External Rotation  30 deg  [facility]                                               Strength:    Joint: Eval Date: 1/20/2021  Re-Assess Date:  Re-Assess Date:     RIGHT LEFT RIGHT LEFT RIGHT LEFT   Shoulder Flexion 5/5 3+/5           Shoulder Abduction  (C5) 5/5 4-/5           Shoulder Internal Rotation 5/5 4/5           Shoulder External Rotation 5/5 3+/5           Elbow Flexion  (C6) 5/5 4-/5           Elbow Extension (C7) 5/5 4+/5           Wrist Flexion (C7) 5/5 5/5           Wrist Extension (C6)             Resisted Thumb Extension/Finger Abduction (C8/T1)          Resisted Cervical Rotation (C1):             Resisted Shoulder Shrug (C2, 3, 4):               Strength                                                        Functional Mobility:  Assessed @ Initial Visit  -- Limited OH movement. Body Structures Involved:  1. Bones  2. Joints  3. Muscles  4. Ligaments Body Functions Affected:  1. Sensory/Pain  2. Neuromusculoskeletal  3. Movement Related Activities and Participation Affected:  1. Mobility  2. Self Care   Number of elements that affect the Plan of Care: 4+: HIGH COMPLEXITY   CLINICAL PRESENTATION:   Presentation: Stable and uncomplicated: LOW COMPLEXITY   CLINICAL DECISION MAKING:      Use of outcome tool(s) and clinical judgement create a POC that gives a: Clear prediction of patient's progress: LOW COMPLEXITY     See associated treatment note for treatment provided today. No future appointments.       SEPIDEH MckeonT

## 2021-01-19 NOTE — PROGRESS NOTES
Bryant Zaidimp  : 1965  Payor: Kristi Found / Plan: [facility] / Product Type: PPO /  Amanda Fling at [address] [facility]. [address] [place] [address], [facility], [address] [place] [address]  [phone]   [phone]                                                          Henrry Brooke MD      OUTPATIENT PHYSICAL THERAPY: Daily Treatment Note 2021 Visit Count:  1    Tx Diagnosis:    Pain in Left Shoulder (M25.512)  Stiffness of Left Shoulder not elsewhere specified (M25.612)  Primary Osteoarthritis of Left Shoulder (M19.012)  Strain of muscle(s) and tendon(s) of the rotator cuff of Left Shoulder, sequela (S46.012S)           Pre-treatment Symptoms/Complaints:  See Initial Eval Dated 21 for more details. Current ROM:   ACTIVE ROM (standing) RIGHT LEFT   Shoulder Flexion 180  130 deg   Shoulder Abduction 180 deg with twinge 60 deg   Shoulder Internal Rotation (Apley's) T5 L1    Shoulder External Rotation (Apley's) T3 Top of head    Elbow ROM     PASSIVE ROM (supine)     Shoulder Flexion  160 deg    Shoulder Abduction  153 deg    Shoulder Internal Rotation  70 deg   Shoulder External Rotation  30 deg          Pain: Initial:2/10 Post Session: 1/10   Medications Last Reviewed:  2021     Updated Objective Findings: See Initial Eval for more details. TREATMENT:   THERAPEUTIC EXERCISE: (15 minutes):  Exercises per grid below to improve mobility, strength and balance. Required minimal visual, verbal and manual cues to promote proper body alignment and promote proper body posture. Progressed resistance and complexity of movement as indicated.      Date:  2021 Date:   Date:     Activity/Exercise Parameters Parameters Parameters   Education HEP, POC, PT goals, anatomy/pathology     Isometrics: ER, IR, Flexion, Abduction 10x10\"     UBE      Wand flexion/abduction      Wall slides                        MANUAL THERAPY: (10 minutes): Joint mobilization, Soft tissue mobilization was utilized and necessary because of the patient's restricted joint motion and restricted motion of soft tissue mobility. Date  1/20/2021    Technique Used Grade  Level # Time(s) Effect while being performed   PROM    L all planes   STM incision sites                                                [employer] Portal  Treatment/Session Summary:    Response to Treatment: Pt demonstrated understanding of POC and initial HEP. No increase in pain or adverse reactions. Communication/Consultation:  POC, HEP, PT goals, Faxed initial evaluation to MD.   Equipment provided today: HEP Handout     Recommendations/Intent for next treatment session:   Next visit will focus on Pain Science Education RTC strengthening soft tissue mobilization. Treatment Plan of Care Effective Dates: 1/20/2021 TO 4/19/2021 (90 days). Frequency/Duration: 2 times a week for 90 Days             Total Treatment Billable Duration:   25  Rx plus Eval   PT Patient Time In/Time Out  Time In: 1400  Time Out: [facility] Isabella Graham DPT    No future appointments.

## 2021-01-20 ENCOUNTER — HOSPITAL ENCOUNTER (OUTPATIENT)
Dept: PHYSICAL THERAPY | Age: 56
Discharge: HOME | End: 2021-01-20
Payer: COMMERCIAL

## 2021-01-20 PROCEDURE — 97110 THERAPEUTIC EXERCISES: CPT

## 2021-01-20 PROCEDURE — 97161 PT EVAL LOW COMPLEX 20 MIN: CPT

## 2021-01-20 PROCEDURE — 97140 MANUAL THERAPY 1/> REGIONS: CPT

## 2021-01-26 ENCOUNTER — HOSPITAL ENCOUNTER (OUTPATIENT)
Dept: PHYSICAL THERAPY | Age: 56
Discharge: HOME | End: 2021-01-26
Payer: COMMERCIAL

## 2021-01-26 PROCEDURE — 97110 THERAPEUTIC EXERCISES: CPT
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
## 2021-01-28 ENCOUNTER — HOSPITAL ENCOUNTER (OUTPATIENT)
Dept: PHYSICAL THERAPY | Age: 56
Discharge: HOME | End: 2021-01-28
Payer: COMMERCIAL

## 2021-01-28 PROCEDURE — 97110 THERAPEUTIC EXERCISES: CPT

## 2021-01-28 NOTE — PROGRESS NOTES
Ronny Vasquez  : 1965  Payor: Ildefonso Hardin / Plan: [facility] / Product Type: PPO /  [address] [facility], [address] [place] [address], [facility], [address] [place] [address]  [phone]   [phone]                                                          Virginie Darby MD      OUTPATIENT PHYSICAL THERAPY: Daily Treatment Note 2021 Visit Count:  3    Tx Diagnosis:    Pain in Left Shoulder (M25.512)  Stiffness of Left Shoulder not elsewhere specified (M25.612)  Primary Osteoarthritis of Left Shoulder (M19.012)  Strain of muscle(s) and tendon(s) of the rotator cuff of Left Shoulder, sequela (S46.012S)           Pre-treatment Symptoms/Complaints:  See Initial Eval Dated 21 for more details. *My bicep (L) is really sore. I saw MD and changed my pain medicine. \"       Current ROM:   ACTIVE ROM (standing) RIGHT LEFT   Shoulder Flexion 180  130 deg   Shoulder Abduction 180 deg with twinge 60 deg   Shoulder Internal Rotation (Apley's) T5 L1    Shoulder External Rotation (Apley's) T3 Top of head    Elbow ROM     PASSIVE ROM (supine)     Shoulder Flexion  160 deg    Shoulder Abduction  153 deg    Shoulder Internal Rotation  70 deg   Shoulder External Rotation  30 deg          Pain: Initial:2/10 Post Session: 1/10   Medications Last Reviewed:  2021     Updated Objective Findings: See Initial Eval for more details. TREATMENT:   THERAPEUTIC EXERCISE: (40 minutes):  Exercises per grid below to improve mobility, strength and balance. Required minimal visual, verbal and manual cues to promote proper body alignment and promote proper body posture. Progressed resistance and complexity of movement as indicated.      Date:  Eval Date:  21 Date:  21   Activity/Exercise Parameters Parameters Parameters   Education HEP, POC, PT goals, anatomy/pathology     Isometrics: ER, IR, Flexion, Abduction 10x10\" 10x10\"    UBE  3 min each direction level 1 3 min each direction level 1   Wand flexion/abduction  Shown how to perform for HEP    Wall slides  10x 10x Flexion, abduction. AROM   10X 10x scaption,  10x snow angels      Rows  Blue 10x10\" Black 10x10\"   Extension TB  Blue 10x10\" Black 10x10\"   ER TB  Blue 10x10\" Black 10x10\"   IR TB  Blue 10x10\" Black 10x10\"   Shoulder taps  20x 20x   Clocks   *---   Shrugs   20x 5#    Push up plus   10x                      MANUAL THERAPY: (- minutes): Joint mobilization, Soft tissue mobilization was utilized and necessary because of the patient's restricted joint motion and restricted motion of soft tissue mobility. Date  1/28/2021    Technique Used Grade  Level # Time(s) Effect while being performed   PROM    L all planes   STM incision sites                                                MedBridge Portal  Treatment/Session Summary:   Progressed as seen above--he does have issues with fluid ROM,, but this will improve as time goes on. Continued UBE. Can only actively abduct ~100-115 deg--encouraged to work on this at home. Response to Treatment: Pt demonstrated understanding of POC and initial HEP. No increase in pain or adverse reactions. Communication/Consultation:  POC, HEP, PT goals, Faxed initial evaluation to MD.   Equipment provided today: HEP Handout     Recommendations/Intent for next treatment session:   Next visit will focus on Pain Science Education RTC strengthening soft tissue mobilization. Treatment Plan of Care Effective Dates: 1/28/2021 TO 4/19/2021 (90 days).   Frequency/Duration: 2 times a week for 90 Days             Total Treatment Billable Duration:  40 Rx   PT Patient Time In/Time Out  Time In: 1050  Time Out: [address] Sergio Menchaca DPT    Future Appointments   Date Time Provider Steve Galeana   2/1/2021  1:45 PM Luz Gabriel DPT [facility]   2/4/2021  1:00 PM [place] [facility]   2/8/2021  1:00 PM Luz Gabriel DPT [facility]   2/11/2021  1:00 PM Gu Destini, DPT [facility]   2/15/2021  1:45 PM Gu Destini, DPT [facility]   2/18/2021  1:00 PM Gu Destini, DPT [facility]   2/22/2021  1:00 PM Gu Destini, DPT [facility]   2/25/2021  1:00 PM Gu Destini, DPT [facility]

## 2021-02-01 ENCOUNTER — HOSPITAL ENCOUNTER (OUTPATIENT)
Dept: PHYSICAL THERAPY | Age: 56
Discharge: HOME | End: 2021-02-01
Payer: COMMERCIAL

## 2021-02-01 PROCEDURE — 97110 THERAPEUTIC EXERCISES: CPT

## 2021-02-01 NOTE — PROGRESS NOTES
Corbin Sutton  : 1965  Payor: Vickie Sessions / Plan: [facility] / Product Type: PPO /  [address] Alverto Tavarez, [address], [address] [address]  [phone]   [phone]                                                          Zoe Moy MD      OUTPATIENT PHYSICAL THERAPY: Daily Treatment Note 2021 Visit Count:  4    Tx Diagnosis:    Pain in Left Shoulder (M25.512)  Stiffness of Left Shoulder not elsewhere specified (M25.612)  Primary Osteoarthritis of Left Shoulder (M19.012)  Strain of muscle(s) and tendon(s) of the rotator cuff of Left Shoulder, sequela (S46.012S)           Pre-treatment Symptoms/Complaints:  See Initial Eval Dated 21 for more details. *I wore a backpack blower for an hour and a half and my L shoulder is really sore. Current ROM:   ACTIVE ROM (standing) RIGHT LEFT   Shoulder Flexion 180  130 deg   Shoulder Abduction 180 deg with twinge 60 deg   Shoulder Internal Rotation (Apley's) T5 L1    Shoulder External Rotation (Apley's) T3 Top of head    Elbow ROM     PASSIVE ROM (supine)     Shoulder Flexion  160 deg    Shoulder Abduction  153 deg    Shoulder Internal Rotation  70 deg   Shoulder External Rotation  30 deg          Pain: Initial:2/10 Post Session: 1/10   Medications Last Reviewed:  2021     Updated Objective Findings: See Initial Eval for more details. TREATMENT:   THERAPEUTIC EXERCISE: (53 minutes):  Exercises per grid below to improve mobility, strength and balance. Required minimal visual, verbal and manual cues to promote proper body alignment and promote proper body posture. Progressed resistance and complexity of movement as indicated.      Date:  Eval Date:  21 Date:  21 Date:  21   Activity/Exercise Parameters Parameters Parameters    Education HEP, POC, PT goals, anatomy/pathology      Isometrics: ER, IR, Flexion, Abduction 10x10\" 10x10\"     UBE  3 min each direction level 1 3 min each direction level 1 4 minutes E direction level 1   Wand flexion/abduction  Shown how to perform for HEP     Wall slides  10x 10x Flexion, abduction. 10x flexion  10x abduction    AROM   10X 10x scaption,  10x snow angels    10x scaption  10x snow angels   Rows  Blue 10x10\" Black 10x10\" Black 10x10\"   Extension TB  Blue 10x10\" Black 10x10\" Black 10x10\"   ER TB  Blue 10x10\" Black 10x10\" Black 10x10\"   IR TB  Blue 10x10\" Black 10x10\" Black 10x10\"   Shoulder taps  20x 20x 20x   Clocks   *---    Shrugs   20x 5#  20x 5#   Push up plus   10x  10x   Walking     4 minutes. MANUAL THERAPY: (- minutes): Joint mobilization, Soft tissue mobilization was utilized and necessary because of the patient's restricted joint motion and restricted motion of soft tissue mobility. Date  2/1/2021    Technique Used Grade  Level # Time(s) Effect while being performed   PROM    L all planes   STM incision sites                                                MedBridge Portal  Treatment/Session Summary:   Román Oconnor really enjoys UBE as warm up--better tolerance with resistance exercises today. Response to Treatment: Pt demonstrated understanding of POC and initial HEP. No increase in pain or adverse reactions. Communication/Consultation:  POC, HEP, PT goals, Faxed initial evaluation to MD.   Equipment provided today: HEP Handout     Recommendations/Intent for next treatment session:   Next visit will focus on Pain Science Education RTC strengthening soft tissue mobilization. Treatment Plan of Care Effective Dates: 2/1/2021 TO 4/19/2021 (90 days).   Frequency/Duration: 2 times a week for 90 Days             Total Treatment Billable Duration:  53 Rx   PT Patient Time In/Time Out  Time In: 1345  Time Out: [facility] Adam Garibay DPT    Future Appointments   Date Time Provider Steve Galeana   2/4/2021  1:00 PM Aubrey Galeazzi, DPT [facility] 2/8/2021  1:00 PM Toña Mendez, [place] [facility]   2/11/2021  1:00 PM Toña Mendez DPT [facility]   2/15/2021  1:45 PM Toña Mendez DPT [facility]   2/18/2021  1:00 PM Toña Mendez, [facility]   2/22/2021  1:00 PM Toña Mendez, [place] [facility]   2/25/2021  1:00 PM Toña Mendez, [place] [facility]

## 2021-02-04 ENCOUNTER — HOSPITAL ENCOUNTER (OUTPATIENT)
Dept: PHYSICAL THERAPY | Age: 56
Discharge: HOME | End: 2021-02-04
Payer: COMMERCIAL

## 2021-02-04 PROCEDURE — 97110 THERAPEUTIC EXERCISES: CPT

## 2021-02-04 NOTE — PROGRESS NOTES
Starr Parikh  : 1965  Payor: Merline Grow / [place]: [place] [facility] / Product Type: O /  [address] [facility] at [address] [place] [address]. [address] [facility], [address] [place] [address]  [phone]   [phone]                                                          Cathryn Smith MD      OUTPATIENT PHYSICAL THERAPY: Daily Treatment Note 2021 Visit Count:  5    Tx Diagnosis:    Pain in Left Shoulder (M25.512)  Stiffness of Left Shoulder not elsewhere specified (M25.612)  Primary Osteoarthritis of Left Shoulder (M19.012)  Strain of muscle(s) and tendon(s) of the rotator cuff of Left Shoulder, sequela (S46.012S)           Pre-treatment Symptoms/Complaints:  See Initial Eval Dated 21 for more details. *I am having soreness with out to the side. \"       Current ROM:   ACTIVE ROM (standing) RIGHT LEFT   Shoulder Flexion 180  130 deg   Shoulder Abduction 180 deg with twinge 60 deg   Shoulder Internal Rotation (Apley's) T5 L1    Shoulder External Rotation (Apley's) T3 Top of head    Elbow ROM     PASSIVE ROM (supine)     Shoulder Flexion  160 deg    Shoulder Abduction  153 deg    Shoulder Internal Rotation  70 deg   Shoulder External Rotation  30 deg          Pain: Initial:2/10 Post Session: 1/10   Medications Last Reviewed:  2021     Updated Objective Findings: See Initial Eval for more details. TREATMENT:   THERAPEUTIC EXERCISE: (53 minutes):  Exercises per grid below to improve mobility, strength and balance. Required minimal visual, verbal and manual cues to promote proper body alignment and promote proper body posture. Progressed resistance and complexity of movement as indicated.      Date:  Eval Date:  21 Date:  21 Date:  21 Date:  21   Activity/Exercise Parameters Parameters Parameters     Education HEP, POC, PT goals, anatomy/pathology       Isometrics: ER, IR, Flexion, Abduction 10x10\" 10x10\"      UBE  3 min each direction level 1 3 min each direction level 1 4 minutes E direction level 1 4 minutes backwards, 4 minutes forward level 2.0   Wand flexion/abduction  Shown how to perform for HEP      Wall slides  10x 10x Flexion, abduction. 10x flexion  10x abduction  10x flexion  10x abduction   AROM   10X 10x scaption,  10x snow angels    10x scaption  10x snow angels 10x scaption  10x snow angels   Rows  Blue 10x10\" Black 10x10\" Black 10x10\" 23# 3x10   Extension TB  Blue 10x10\" Black 10x10\" Black 10x10\" 13# 2x10    ER TB  Blue 10x10\" Black 10x10\" Black 10x10\" Black 10x10\"   IR TB  Blue 10x10\" Black 10x10\" Black 10x10\" Black 10x10\"   Shoulder taps  20x 20x 20x 20x   Clocks   *---     Shrugs   20x 5#  20x 5# 20x 7#   Push up plus   10x  10x 10x2   Walking     4 minutes. 4 minuutes                 MANUAL THERAPY: (- minutes): Joint mobilization, Soft tissue mobilization was utilized and necessary because of the patient's restricted joint motion and restricted motion of soft tissue mobility. Date  2/4/2021    Technique Used Grade  Level # Time(s) Effect while being performed   PROM    L all planes   STM incision sites                                                MedBridge Portal  Treatment/Session Summary:   Nolan Eckert still having difficulty with abduction. Progressed shrugs to 7# with minimal difficulty--please see addition of FM. Response to Treatment: Pt demonstrated understanding of POC and initial HEP. No increase in pain or adverse reactions. Communication/Consultation:  POC, HEP, PT goals, Faxed initial evaluation to MD.   Equipment provided today: HEP Handout     Recommendations/Intent for next treatment session:   Next visit will focus on Pain Science Education RTC strengthening soft tissue mobilization. Treatment Plan of Care Effective Dates: 2/4/2021 TO 4/19/2021 (90 days).   Frequency/Duration: 2 times a week for 90 Days             Total Treatment Billable Duration:  53 Rx   PT Patient Time In/Time Out  Time In: 1300  Time Out: [facility], [place]    Future Appointments   Date Time Provider Steve Galeana   2/8/2021  1:00 PM Fernie Momin DPT [facility]   2/11/2021  1:00 PM Fernie Momin DPT [facility]   2/15/2021  1:45 PM Fernie Momin DPT [facility]   2/18/2021  1:00 PM Fernie Momin DPT [facility] AND [facility]   2/22/2021  1:00 PM Fernie Momin DPT [facility]   2/25/2021  1:00 PM Fernie Momin DPT [facility]

## 2021-02-08 ENCOUNTER — HOSPITAL ENCOUNTER (OUTPATIENT)
Dept: PHYSICAL THERAPY | Age: 56
Discharge: HOME | End: 2021-02-08
Payer: COMMERCIAL

## 2021-02-08 PROCEDURE — 97110 THERAPEUTIC EXERCISES: CPT

## 2021-02-08 NOTE — PROGRESS NOTES
Chucky Crespo  : 1965  Payor: Magui Guard / Plan: [place] [facility] / Product Type: PPO /  [address] [facility] at [address] [place] [address]. Alverto Tavarez, [address] [place] [address]  [phone]   [phone]                                                          Alejandro Barreto MD      OUTPATIENT PHYSICAL THERAPY: Daily Treatment Note 2021 Visit Count:  6    Tx Diagnosis:    Pain in Left Shoulder (M25.512)  Stiffness of Left Shoulder not elsewhere specified (M25.612)  Primary Osteoarthritis of Left Shoulder (M19.012)  Strain of muscle(s) and tendon(s) of the rotator cuff of Left Shoulder, sequela (S46.012S)           Pre-treatment Symptoms/Complaints:  See Initial Eval Dated 21 for more details. *I am sore. .. I've been sore for days. I started taking over the counter medication instead of the other stuff. \"     Current ROM:   ACTIVE ROM (standing) RIGHT LEFT   Shoulder Flexion 180  130 deg   Shoulder Abduction 180 deg with twinge 60 deg   Shoulder Internal Rotation (Apley's) T5 L1    Shoulder External Rotation (Apley's) T3 Top of head    Elbow ROM     PASSIVE ROM (supine)     Shoulder Flexion  160 deg    Shoulder Abduction  153 deg    Shoulder Internal Rotation  70 deg   Shoulder External Rotation  30 deg          Pain: Initial:2/10 Post Session: 1/10   Medications Last Reviewed:  2021     Updated Objective Findings: See Initial Eval for more details. TREATMENT:   THERAPEUTIC EXERCISE: (38 minutes):  Exercises per grid below to improve mobility, strength and balance. Required minimal visual, verbal and manual cues to promote proper body alignment and promote proper body posture. Progressed resistance and complexity of movement as indicated.      Date:  Eval Date:  21 Date:  21 Date:  21 Date:  21 Date:  21   Activity/Exercise Parameters Parameters Parameters      Education HEP, POC, PT goals, anatomy/pathology Isometrics: ER, IR, Flexion, Abduction 10x10\" 10x10\"       UBE  3 min each direction level 1 3 min each direction level 1 4 minutes E direction level 1 4 minutes backwards, 4 minutes forward level 2.0 4 minutes backwards, 4 minutes forward level 2.0   Wand flexion/abduction  Shown how to perform for HEP       Wall slides  10x 10x Flexion, abduction. 10x flexion  10x abduction  10x flexion  10x abduction 10x Flexion  10x Abduction   AROM   10X 10x scaption,  10x snow angels    10x scaption  10x snow angels 10x scaption  10x snow angels 10x scaption  10x snow angels     Rows  Blue 10x10\" Black 10x10\" Black 10x10\" 23# 3x10 23# 3x10   Extension TB  Blue 10x10\" Black 10x10\" Black 10x10\" 13# 2x10  13# 2x10   ER TB  Blue 10x10\" Black 10x10\" Black 10x10\" Black 10x10\" Black 10x10\"   IR TB  Blue 10x10\" Black 10x10\" Black 10x10\" Black 10x10\" Black 10x10\"    Shoulder taps  20x 20x 20x 20x 25x   Clocks   *---   Blue 5x10\"   Shrugs   20x 5#  20x 5# 20x 7# 20 x 7#   Push up plus   10x  10x 10x2 10x2   Walking     4 minutes. 4 minuutes                   MANUAL THERAPY: (1 minutes): Joint mobilization, Soft tissue mobilization was utilized and necessary because of the patient's restricted joint motion and restricted motion of soft tissue mobility. Date  2/8/2021    Technique Used Grade  Level # Time(s) Effect while being performed   PROM    L all planes   STM incision sites       Inferior mobs IV L [facility] Portal  Treatment/Session Summary:   Othella Matter - continued exercises on machine - overall doing well. Limitation with abduction > flexion, and movement slow at first, but loosens up with continued practice. Response to Treatment: Pt demonstrated understanding of POC and initial HEP. No increase in pain or adverse reactions.    Communication/Consultation:  POC, HEP, PT goals, Faxed initial evaluation to MD.   Equipment provided today: HEP Handout Recommendations/Intent for next treatment session:   Next visit will focus on Pain Science Education RTC strengthening soft tissue mobilization. Treatment Plan of Care Effective Dates: 2/8/2021 TO 4/19/2021 (90 days).   Frequency/Duration: 2 times a week for 90 Days             Total Treatment Billable Duration:  39 Rx   PT Patient Time In/Time Out  Time In: 1300  Time Out: [address] Maritza Zamora DPT    Future Appointments   Date Time Provider Steve Galeana   2/11/2021  1:00 PM Edilson Bennett, [facility]   2/15/2021  1:45 PM Edilsonafsaneh Bennett, [facility]   2/18/2021  1:00 PM Edilsonafsaneh Bennett, DPT [facility]   2/22/2021  1:00 PM Edilson Bennett, DPT [facility]   2/25/2021  1:00 PM Edilsonafsaneh Bennett, DPT [facility]

## 2021-02-11 ENCOUNTER — HOSPITAL ENCOUNTER (OUTPATIENT)
Dept: PHYSICAL THERAPY | Age: 56
Discharge: HOME | End: 2021-02-11
Payer: COMMERCIAL

## 2021-02-11 PROCEDURE — 97110 THERAPEUTIC EXERCISES: CPT

## 2021-02-11 NOTE — PROGRESS NOTES
Cindy Thomason  : 1965  Payor: Venessa Noe / Plan: [facility] / Product Type: PPO /  [address], [address] [place] [address], [facility], [address] [place] [address]  [phone]   [phone]                                                          Kenyatta Rodríguez MD      OUTPATIENT PHYSICAL THERAPY: Daily Treatment Note 2021 Visit Count:  7    Tx Diagnosis:    Pain in Left Shoulder (M25.512)  Stiffness of Left Shoulder not elsewhere specified (M25.612)  Primary Osteoarthritis of Left Shoulder (M19.012)  Strain of muscle(s) and tendon(s) of the rotator cuff of Left Shoulder, sequela (S46.012S)           Pre-treatment Symptoms/Complaints:  See Initial Eval Dated 21 for more details. I am sore after moving it a lot. But I can tell it's better. Current ROM:   ACTIVE ROM (standing) RIGHT LEFT   Shoulder Flexion 180  130 deg   Shoulder Abduction 180 deg with twinge 60 deg   Shoulder Internal Rotation (Apley's) T5 L1    Shoulder External Rotation (Apley's) T3 Top of head    Elbow ROM     PASSIVE ROM (supine)     Shoulder Flexion  160 deg    Shoulder Abduction  153 deg    Shoulder Internal Rotation  70 deg   Shoulder External Rotation  30 deg          Pain: Initial:2/10 Post Session: 1/10   Medications Last Reviewed:  2021     Updated Objective Findings: See Initial Eval for more details. TREATMENT:   THERAPEUTIC EXERCISE: (53 minutes):  Exercises per grid below to improve mobility, strength and balance. Required minimal visual, verbal and manual cues to promote proper body alignment and promote proper body posture. Progressed resistance and complexity of movement as indicated.      Date:  Eval Date:  21 Date:  21 Date:  21 Date:  21 Date:  21 Date:  21   Activity/Exercise Parameters Parameters Parameters       Education HEP, POC, PT goals, anatomy/pathology         Isometrics: ER, IR, Flexion, Abduction 10x10\" 10x10\"        UBE  3 min each direction level 1 3 min each direction level 1 4 minutes E direction level 1 4 minutes backwards, 4 minutes forward level 2.0 4 minutes backwards, 4 minutes forward level 2.0 4 minutes backwards, 4 minutes forward level 2.0   Wand flexion/abduction  Shown how to perform for HEP        Wall slides  10x 10x Flexion, abduction. 10x flexion  10x abduction  10x flexion  10x abduction 10x Flexion  10x Abduction 10x Flexion  10x Abduction   AROM   10X 10x scaption,  10x snow angels    10x scaption  10x snow angels 10x scaption  10x snow angels 10x scaption  10x snow angels   10x scaption  10x snow angels   Rows  Blue 10x10\" Black 10x10\" Black 10x10\" 23# 3x10 23# 3x10 23# 3x10   Extension TB  Blue 10x10\" Black 10x10\" Black 10x10\" 13# 2x10  13# 2x10 13# 2x10   ER TB  Blue 10x10\" Black 10x10\" Black 10x10\" Black 10x10\" Black 10x10\" Black 10x10\"   IR TB  Blue 10x10\" Black 10x10\" Black 10x10\" Black 10x10\" Black 10x10\"  Black 10x10\"   Shoulder taps  20x 20x 20x 20x 25x 25x   Clocks   *---   Blue 5x10\" Blue 5x10\"   Shrugs   20x 5#  20x 5# 20x 7# 20 x 7# 20x 7#   Push up plus   10x  10x 10x2 10x2 10x2   Walking     4 minutes. 4 minuutes     All Fours       L stabilizing          MANUAL THERAPY: ( minutes): Joint mobilization, Soft tissue mobilization was utilized and necessary because of the patient's restricted joint motion and restricted motion of soft tissue mobility. Date  2/11/2021    Technique Used Grade  Level # Time(s) Effect while being performed   PROM    L all planes   STM incision sites       Inferior mobs IV L [facility] Portal  Treatment/Session Summary:   Edi Crimes - Performed rows with rope today--ROM improving with ER TB. Much difficulty with stablilizing on All 4's. Response to Treatment: Pt demonstrated understanding of POC and initial HEP. No increase in pain or adverse reactions. Communication/Consultation:  POC, HEP, PT goals, Faxed initial evaluation to MD.   Equipment provided today: HEP Handout     Recommendations/Intent for next treatment session:   Next visit will focus on Pain Science Education RTC strengthening soft tissue mobilization. Treatment Plan of Care Effective Dates: 2/11/2021 TO 4/19/2021 (90 days).   Frequency/Duration: 2 times a week for 90 Days             Total Treatment Billable Duration: 48' Rx   PT Patient Time In/Time Out  Time In: 1330  Time Out: [facility] Zeke Martinez DPT    Future Appointments   Date Time Provider Steve Galeana   2/15/2021  1:45 PM Ovidio Hollingsworth, DPT [facility] AND [facility]   2/18/2021  1:00 PM Ovidiobilly Hollingsworth, DPT [facility]   2/22/2021  1:00 PM Ovidio Nicely, DPT [facility]   2/25/2021  1:00 PM Ovidio Hollingsworth, DPT [facility]

## 2021-02-15 ENCOUNTER — HOSPITAL ENCOUNTER (OUTPATIENT)
Dept: PHYSICAL THERAPY | Age: 56
Discharge: HOME | End: 2021-02-15
Payer: COMMERCIAL

## 2021-02-15 PROCEDURE — 97110 THERAPEUTIC EXERCISES: CPT

## 2021-02-15 NOTE — PROGRESS NOTES
Tato Martin  : 1965  Payor: Marisol Renae / Plan: [facility] / Product Type: PPO /  [address] [facility], [address] [place] [address], [facility], [address] [place] [address]  [phone]   [phone]                                                          Jacqui Hardin MD      OUTPATIENT PHYSICAL THERAPY: Daily Treatment Note 2/15/2021 Visit Count:  8    Tx Diagnosis:    Pain in Left Shoulder (M25.512)  Stiffness of Left Shoulder not elsewhere specified (M25.612)  Primary Osteoarthritis of Left Shoulder (M19.012)  Strain of muscle(s) and tendon(s) of the rotator cuff of Left Shoulder, sequela (S46.012S)           Pre-treatment Symptoms/Complaints:  See Initial Eval Dated 21 for more details. I think the weather is making me sore and hurt alittle but the motion is getting better. Current ROM:   ACTIVE ROM (standing) RIGHT LEFT   Shoulder Flexion 180  130 deg   Shoulder Abduction 180 deg with twinge 60 deg   Shoulder Internal Rotation (Apley's) T5 L1    Shoulder External Rotation (Apley's) T3 Top of head    Elbow ROM     PASSIVE ROM (supine)     Shoulder Flexion  160 deg    Shoulder Abduction  153 deg    Shoulder Internal Rotation  70 deg   Shoulder External Rotation  30 deg          Pain: Initial:2/10 Post Session: 1/10   Medications Last Reviewed:  2/15/2021     Updated Objective Findings: See Initial Eval for more details. TREATMENT:   THERAPEUTIC EXERCISE: (41 minutes):  Exercises per grid below to improve mobility, strength and balance. Required minimal visual, verbal and manual cues to promote proper body alignment and promote proper body posture. Progressed resistance and complexity of movement as indicated.      Date:  Eval Date:  21 Date:  21 Date:  21 Date:  21 Date:  21 Date:  21 DATE:  2.15.21   Activity/Exercise Parameters Parameters Parameters        Education HEP, POC, PT goals, anatomy/pathology Isometrics: ER, IR, Flexion, Abduction 10x10\" 10x10\"         UBE  3 min each direction level 1 3 min each direction level 1 4 minutes E direction level 1 4 minutes backwards, 4 minutes forward level 2.0 4 minutes backwards, 4 minutes forward level 2.0 4 minutes backwards, 4 minutes forward level 2.0 4 minutes backwards, 4 minutes forward level 2.0   Wand flexion/abduction  Shown how to perform for HEP         Wall slides  10x 10x Flexion, abduction. 10x flexion  10x abduction  10x flexion  10x abduction 10x Flexion  10x Abduction 10x Flexion  10x Abduction 10x Flexion  10x Abduction   AROM   10X 10x scaption,  10x snow angels    10x scaption  10x snow angels 10x scaption  10x snow angels 10x scaption  10x snow angels   10x scaption  10x snow angels 10x scaption  10x snow angels   Rows  Blue 10x10\" Black 10x10\" Black 10x10\" 23# 3x10 23# 3x10 23# 3x10 23# 3x10   Extension TB  Blue 10x10\" Black 10x10\" Black 10x10\" 13# 2x10  13# 2x10 13# 2x10 13# 2x10   ER TB  Blue 10x10\" Black 10x10\" Black 10x10\" Black 10x10\" Black 10x10\" Black 10x10\" Black 10x10\"    IR TB  Blue 10x10\" Black 10x10\" Black 10x10\" Black 10x10\" Black 10x10\"  Black 10x10\" Black 10x10\"    Shoulder taps  20x 20x 20x 20x 25x 25x 25x   Clocks   *---   Blue 5x10\" Blue 5x10\" Blue 5x10\"   Shrugs   20x 5#  20x 5# 20x 7# 20 x 7# 20x 7# 20x 7#   Push up plus   10x  10x 10x2 10x2 10x2 10x2   Walking     4 minutes. 4 minuutes      All Fours       L stabilizing  L stabilizing 10x10\"         MANUAL THERAPY: ( minutes): Joint mobilization, Soft tissue mobilization was utilized and necessary because of the patient's restricted joint motion and restricted motion of soft tissue mobility.         Date  2/15/2021    Technique Used Grade  Level # Time(s) Effect while being performed   PROM    L all planes   STM incision sites       Inferior mobs IV L [facility]                                       [facility] Portal  Treatment/Session Summary:   Ronny Alejandro does have difficulty with ROM, but does show consistent improvement. Response to Treatment: Pt demonstrated understanding of POC and initial HEP. No increase in pain or adverse reactions. Communication/Consultation:  POC, HEP, PT goals, Faxed initial evaluation to MD.   Equipment provided today: HEP Handout     Recommendations/Intent for next treatment session:   Next visit will focus on Pain Science Education RTC strengthening soft tissue mobilization. Treatment Plan of Care Effective Dates: 2/15/2021 TO 4/19/2021 (90 days).   Frequency/Duration: 2 times a week for 90 Days             Total Treatment Billable Duration: 39' Rx   PT Patient Time In/Time Out  Time In: 1345  Time Out: [address] Maritza Zamora DPT    Future Appointments   Date Time Provider Steve Galeana   2/18/2021  1:00 PM Edilson Bennett DPT [facility] AND [facility]   2/25/2021  1:00 PM Edilson Bennett DPT [facility]

## 2021-02-18 ENCOUNTER — HOSPITAL ENCOUNTER (OUTPATIENT)
Dept: PHYSICAL THERAPY | Age: 56
Discharge: HOME | End: 2021-02-18
Payer: COMMERCIAL

## 2021-02-18 PROCEDURE — 97110 THERAPEUTIC EXERCISES: CPT

## 2021-02-18 PROCEDURE — 97140 MANUAL THERAPY 1/> REGIONS: CPT

## 2021-02-18 NOTE — PROGRESS NOTES
Ronny Vasquez  : 1965  Payor: Ildefonso Hardin / Plan: [facility] / Product Type: PPO /  [address] [facility], [address] [place] [address], [facility], [address] [place] [address]  [phone]   [phone]                                                          Virginie Darby MD      OUTPATIENT PHYSICAL THERAPY: Daily Treatment Note 2021 Visit Count:  9    Tx Diagnosis:    Pain in Left Shoulder (M25.512)  Stiffness of Left Shoulder not elsewhere specified (M25.612)  Primary Osteoarthritis of Left Shoulder (M19.012)  Strain of muscle(s) and tendon(s) of the rotator cuff of Left Shoulder, sequela (S46.012S)           Pre-treatment Symptoms/Complaints:  See Initial Eval Dated 21 for more details. I have been stretching it at home as much as I can. Current ROM:   ACTIVE ROM (standing) RIGHT LEFT   Shoulder Flexion 180  130 deg   Shoulder Abduction 180 deg with twinge 60 deg   Shoulder Internal Rotation (Apley's) T5 L1    Shoulder External Rotation (Apley's) T3 Top of head    Elbow ROM     PASSIVE ROM (supine)     Shoulder Flexion  160 deg    Shoulder Abduction  153 deg    Shoulder Internal Rotation  70 deg   Shoulder External Rotation  30 deg          Pain: Initial:2/10 Post Session: 1/10   Medications Last Reviewed:  2021     Updated Objective Findings: See Initial Eval for more details. TREATMENT:   THERAPEUTIC EXERCISE: (25 minutes):  Exercises per grid below to improve mobility, strength and balance. Required minimal visual, verbal and manual cues to promote proper body alignment and promote proper body posture. Progressed resistance and complexity of movement as indicated.      Date:  Eval Date:  21 Date:  21 Date:  21 Date:  21 Date:  21 Date:  21 DATE:  2.15.21 Date:  21   Activity/Exercise Parameters Parameters Parameters         Education HEP, POC, PT goals, anatomy/pathology           Isometrics: ER, IR, Flexion, Abduction 10x10\" 10x10\"          UBE  3 min each direction level 1 3 min each direction level 1 4 minutes E direction level 1 4 minutes backwards, 4 minutes forward level 2.0 4 minutes backwards, 4 minutes forward level 2.0 4 minutes backwards, 4 minutes forward level 2.0 4 minutes backwards, 4 minutes forward level 2.0 4/4 level 2   Wand flexion/abduction  Shown how to perform for HEP          Wall slides  10x 10x Flexion, abduction. 10x flexion  10x abduction  10x flexion  10x abduction 10x Flexion  10x Abduction 10x Flexion  10x Abduction 10x Flexion  10x Abduction 10x Flexion  10x Abduction   AROM   10X 10x scaption,  10x snow angels    10x scaption  10x snow angels 10x scaption  10x snow angels 10x scaption  10x snow angels   10x scaption  10x snow angels 10x scaption  10x snow angels 10x scaption  10x snow angls   Rows  Blue 10x10\" Black 10x10\" Black 10x10\" 23# 3x10 23# 3x10 23# 3x10 23# 3x10 23# 3x10   Extension TB  Blue 10x10\" Black 10x10\" Black 10x10\" 13# 2x10  13# 2x10 13# 2x10 13# 2x10 13# 2x10   ER TB  Blue 10x10\" Black 10x10\" Black 10x10\" Black 10x10\" Black 10x10\" Black 10x10\" Black 10x10\"  Black 10x10\"    IR TB  Blue 10x10\" Black 10x10\" Black 10x10\" Black 10x10\" Black 10x10\"  Black 10x10\" Black 10x10\"  Black 10x10\"    Shoulder taps  20x 20x 20x 20x 25x 25x 25x 25x   Clocks   *---   Blue 5x10\" Blue 5x10\" Blue 5x10\"  Blue 5x10\"   Shrugs   20x 5#  20x 5# 20x 7# 20 x 7# 20x 7# 20x 7# 20x 7#   Push up plus   10x  10x 10x2 10x2 10x2 10x2 10x2   Walking     4 minutes. 4 minuutes       All Fours       L stabilizing  L stabilizing 10x10\" L stabilizing 10x10\"         MANUAL THERAPY: (15 minutes): Joint mobilization, Soft tissue mobilization was utilized and necessary because of the patient's restricted joint motion and restricted motion of soft tissue mobility.         Date  2/18/2021    Technique Used Grade  Level # Time(s) Effect while being performed   PROM    L all planes   STM incision sites       Inferior mobs IV L [facility] Portal  Treatment/Session Summary:   Lizandro Carrera - Added manual techniques due to limitations seen with AROM standing today. Response to Treatment: Pt demonstrated understanding of POC and initial HEP. No increase in pain or adverse reactions. Communication/Consultation:  POC, HEP, PT goals, Faxed initial evaluation to MD.   Equipment provided today: HEP Handout     Recommendations/Intent for next treatment session:   Next visit will focus on Pain Science Education RTC strengthening soft tissue mobilization. Treatment Plan of Care Effective Dates: 2/18/2021 TO 4/19/2021 (90 days).   Frequency/Duration: 2 times a week for 90 Days             Total Treatment Billable Duration: 36' Rx   PT Patient Time In/Time Out  Time In: 1300  Time Out: Jluis May DPT    Future Appointments   Date Time Provider Steve Galeana   2/25/2021  1:00 PM Mellisa Gracia DPT [facility] AND [facility]

## 2021-02-22 ENCOUNTER — APPOINTMENT (OUTPATIENT)
Dept: PHYSICAL THERAPY | Age: 56
End: 2021-02-22
Payer: COMMERCIAL

## 2021-02-25 ENCOUNTER — HOSPITAL ENCOUNTER (OUTPATIENT)
Dept: PHYSICAL THERAPY | Age: 56
Discharge: HOME | End: 2021-02-25
Payer: COMMERCIAL

## 2021-02-25 PROCEDURE — 97110 THERAPEUTIC EXERCISES: CPT

## 2021-02-25 NOTE — PROGRESS NOTES
Tato Martin  : 1965  Payor: Marisol Renae / Plan: [facility] / Product Type: PPO /  Daren Cedeño at [facility]. [facility]., [address] [place] [address]  [phone]   [phone]                                                          Jacqui Hardin MD      OUTPATIENT PHYSICAL THERAPY: Daily Treatment Note 2021 Visit Count:  10    Tx Diagnosis:    Pain in Left Shoulder (M25.512)  Stiffness of Left Shoulder not elsewhere specified (M25.612)  Primary Osteoarthritis of Left Shoulder (M19.012)  Strain of muscle(s) and tendon(s) of the rotator cuff of Left Shoulder, sequela (S46.012S)           Pre-treatment Symptoms/Complaints:  See Initial Eval Dated 21 for more details. I did something this week that helps immensely. I wear a brace at night that doesn't make me guard my elbow. Current ROM:   ACTIVE ROM (standing) RIGHT LEFT   Shoulder Flexion 180  130 deg   Shoulder Abduction 180 deg with twinge 60 deg   Shoulder Internal Rotation (Apley's) T5 L1    Shoulder External Rotation (Apley's) T3 Top of head    Elbow ROM     PASSIVE ROM (supine)     Shoulder Flexion  160 deg    Shoulder Abduction  153 deg    Shoulder Internal Rotation  70 deg   Shoulder External Rotation  30 deg          Pain: Initial:2/10 Post Session: 1/10   Medications Last Reviewed:  2021     Updated Objective Findings: See Initial Eval for more details. TREATMENT:   THERAPEUTIC EXERCISE: (40 minutes):  Exercises per grid below to improve mobility, strength and balance. Required minimal visual, verbal and manual cues to promote proper body alignment and promote proper body posture. Progressed resistance and complexity of movement as indicated.      Date:  Eval Date:  21 Date:  21 Date:  21 Date:  21 Date:  21 Date:  21 DATE:  2.15.21 Date:  21 Date:  21   Activity/Exercise Parameters Parameters Parameters Education HEP, POC, PT goals, anatomy/pathology            Isometrics: ER, IR, Flexion, Abduction 10x10\" 10x10\"           UBE  3 min each direction level 1 3 min each direction level 1 4 minutes E direction level 1 4 minutes backwards, 4 minutes forward level 2.0 4 minutes backwards, 4 minutes forward level 2.0 4 minutes backwards, 4 minutes forward level 2.0 4 minutes backwards, 4 minutes forward level 2.0 4/4 level 2 Level 4.5 4/4   Wand flexion/abduction  Shown how to perform for HEP           Wall slides  10x 10x Flexion, abduction. 10x flexion  10x abduction  10x flexion  10x abduction 10x Flexion  10x Abduction 10x Flexion  10x Abduction 10x Flexion  10x Abduction 10x Flexion  10x Abduction    AROM   10X 10x scaption,  10x snow angels    10x scaption  10x snow angels 10x scaption  10x snow angels 10x scaption  10x snow angels   10x scaption  10x snow angels 10x scaption  10x snow angels 10x scaption  10x snow angls 10x2  10x2 e   Rows  Blue 10x10\" Black 10x10\" Black 10x10\" 23# 3x10 23# 3x10 23# 3x10 23# 3x10 23# 3x10 TrX Rows   20x     Extension TB  Blue 10x10\" Black 10x10\" Black 10x10\" 13# 2x10  13# 2x10 13# 2x10 13# 2x10 13# 2x10 Black 10x10\"   ER TB  Blue 10x10\" Black 10x10\" Black 10x10\" Black 10x10\" Black 10x10\" Black 10x10\" Black 10x10\"  Black 10x10\"  Black 10x10\"   IR TB  Blue 10x10\" Black 10x10\" Black 10x10\" Black 10x10\" Black 10x10\"  Black 10x10\" Black 10x10\"  Black 10x10\"  Black 10x10   Shoulder taps  20x 20x 20x 20x 25x 25x 25x 25x 25x   Clocks   *---   Blue 5x10\" Blue 5x10\" Blue 5x10\"  Blue 5x10\" Blue 10x10\"   Shrugs   20x 5#  20x 5# 20x 7# 20 x 7# 20x 7# 20x 7# 20x 7#    Push up plus   10x  10x 10x2 10x2 10x2 10x2 10x2 10x2   Walking     4 minutes.  4 minuutes        All Fours       L stabilizing  L stabilizing 10x10\" L stabilizing 10x10\" Plank 10x10\"   Ring flexion           10x    ER 90 and up          10x blue   SL ER          10x                       MANUAL THERAPY: (- minutes): Joint mobilization, Soft tissue mobilization was utilized and necessary because of the patient's restricted joint motion and restricted motion of soft tissue mobility. Date  2/25/2021    Technique Used Grade  Level # Time(s) Effect while being performed   PROM    L all planes   STM incision sites       Inferior mobs IV L GH                                       [facility] Portal  Treatment/Session Summary:   Phillip Jain - Did not perform manual and focused on exercise today to progress strength. Added TrX for body weight and ROM exercise. Less hiking with shoulder flexion. Response to Treatment: Pt demonstrated understanding of POC and initial HEP. No increase in pain or adverse reactions. Communication/Consultation:  POC, HEP, PT goals, Faxed initial evaluation to MD.   Equipment provided today: HEP Handout     Recommendations/Intent for next treatment session:   Next visit will focus on Pain Science Education RTC strengthening soft tissue mobilization. Treatment Plan of Care Effective Dates: 2/25/2021 TO 4/19/2021 (90 days).   Frequency/Duration: 2 times a week for 90 Days             Total Treatment Billable Duration: 36' Rx   PT Patient Time In/Time Out  Time In: 1300  Time Out: 1 [facility] Daniel Osman DPT    Future Appointments   Date Time Provider Steve Gaelana   3/1/2021  2:30 PM Louvella Dues, DPT [facility] AND [facility]   3/5/2021  1:00 PM Louvella Dues, DPT [facility]   3/8/2021  1:00 PM Louvella Dues, DPT [facility]   3/11/2021  2:30 PM Louvella Dues, DPT [facility]   3/15/2021  1:00 PM Louvella Dues, DPT [facility]   3/18/2021  2:15 PM Louvella Dues, DPT [facility]

## 2021-03-01 ENCOUNTER — HOSPITAL ENCOUNTER (OUTPATIENT)
Dept: PHYSICAL THERAPY | Age: 56
Discharge: HOME | End: 2021-03-01
Payer: COMMERCIAL

## 2021-03-01 PROCEDURE — 97110 THERAPEUTIC EXERCISES: CPT

## 2021-03-01 NOTE — PROGRESS NOTES
Tato Martin  : 1965  Payor: Marisol Renae / Plan: [facility] / Product Type: O /  [address] [facility] at [address] [place] [address]. [address], [id number] [place] [address]  [phone]   [phone]                                                          Jacqui Hardin MD      OUTPATIENT PHYSICAL THERAPY: Daily Treatment Note 3/1/2021 Visit Count:  11    Tx Diagnosis:    Pain in Left Shoulder (M25.512)  Stiffness of Left Shoulder not elsewhere specified (M25.612)  Primary Osteoarthritis of Left Shoulder (M19.012)  Strain of muscle(s) and tendon(s) of the rotator cuff of Left Shoulder, sequela (S46.012S)           Pre-treatment Symptoms/Complaints:  See Initial Eval Dated 21 for more details. \"It's weird, because it's not as tender as it was, but now I wake up in the morning and my bicep and the top of my shoulder is bothering me. My motion was good last Thursday and Friday, and it's right again. \"     Current ROM:   ACTIVE ROM (standing) RIGHT LEFT   Shoulder Flexion 180  130 deg   Shoulder Abduction 180 deg with twinge 60 deg   Shoulder Internal Rotation (Apley's) T5 L1    Shoulder External Rotation (Apley's) T3 Top of head    Elbow ROM     PASSIVE ROM (supine)     Shoulder Flexion  160 deg    Shoulder Abduction  153 deg    Shoulder Internal Rotation  70 deg   Shoulder External Rotation  30 deg          Pain: Initial:2/10 Post Session: 1/10   Medications Last Reviewed:  3/1/2021     Updated Objective Findings: See Initial Eval for more details. TREATMENT:   THERAPEUTIC EXERCISE: (40 minutes):  Exercises per grid below to improve mobility, strength and balance. Required minimal visual, verbal and manual cues to promote proper body alignment and promote proper body posture. Progressed resistance and complexity of movement as indicated.      Date:  Eval Date:  21 Date:  21 Date:  21 Date:  21 Date:  21 Date:  21 DATE:  2.15.21 Date:  2.18.21 Date:  2/25/21 Date:  3.1.21 Date     Activity/Exercise Parameters Parameters Parameters            Education HEP, POC, PT goals, anatomy/pathology              Isometrics: ER, IR, Flexion, Abduction 10x10\" 10x10\"             UBE  3 min each direction level 1 3 min each direction level 1 4 minutes E direction level 1 4 minutes backwards, 4 minutes forward level 2.0 4 minutes backwards, 4 minutes forward level 2.0 4 minutes backwards, 4 minutes forward level 2.0 4 minutes backwards, 4 minutes forward level 2.0 4/4 level 2 Level 4.5 4/4 Level 4.5 4/4    Wand flexion/abduction  Shown how to perform for HEP             Wall slides  10x 10x Flexion, abduction. 10x flexion  10x abduction  10x flexion  10x abduction 10x Flexion  10x Abduction 10x Flexion  10x Abduction 10x Flexion  10x Abduction 10x Flexion  10x Abduction  Manual assist abduction     AROM   10X 10x scaption,  10x snow angels    10x scaption  10x snow angels 10x scaption  10x snow angels 10x scaption  10x snow angels   10x scaption  10x snow angels 10x scaption  10x snow angels 10x scaption  10x snow angls 10x2  10x2 e TrX Rows   20x      Rows  Blue 10x10\" Black 10x10\" Black 10x10\" 23# 3x10 23# 3x10 23# 3x10 23# 3x10 23# 3x10 TrX Rows   20x   TrX Rows   20x      Extension TB  Blue 10x10\" Black 10x10\" Black 10x10\" 13# 2x10  13# 2x10 13# 2x10 13# 2x10 13# 2x10 Black 10x10\"     ER TB  Blue 10x10\" Black 10x10\" Black 10x10\" Black 10x10\" Black 10x10\" Black 10x10\" Black 10x10\"  Black 10x10\"  Black 10x10\"     IR TB  Blue 10x10\" Black 10x10\" Black 10x10\" Black 10x10\" Black 10x10\"  Black 10x10\" Black 10x10\"  Black 10x10\"  Black 10x10     Shoulder taps  20x 20x 20x 20x 25x 25x 25x 25x 25x     Clocks   *---   Blue 5x10\" Blue 5x10\" Blue 5x10\"  Blue 5x10\" Blue 10x10\"     Shrugs   20x 5#  20x 5# 20x 7# 20 x 7# 20x 7# 20x 7# 20x 7#      Push up plus   10x  10x 10x2 10x2 10x2 10x2 10x2 10x2     Walking     4 minutes.  4 minuutes          All Fours       L stabilizing  L stabilizing 10x10\" L stabilizing 10x10\" Plank 10x10\" Plank 10x10\"    Ring flexion           10x      ER 90 and up          10x blue     SL ER          10x      Pulleys           Abduction 4 minutes          MANUAL THERAPY: (- minutes): Joint mobilization, Soft tissue mobilization was utilized and necessary because of the patient's restricted joint motion and restricted motion of soft tissue mobility. Date  3/1/2021    Technique Used Grade  Level # Time(s) Effect while being performed   PROM    L all planes   STM incision sites       Inferior mobs IV L GH                                       MedBridge Portal  Treatment/Session Summary:   Edelmira Agudelo - Continued ring flexion to gait ROM. Overall doing well just needs continued practice. Response to Treatment: Pt demonstrated understanding of POC and initial HEP. No increase in pain or adverse reactions. Communication/Consultation:  POC, HEP, PT goals, Faxed initial evaluation to MD.   Equipment provided today: HEP Handout     Recommendations/Intent for next treatment session:   Next visit will focus on Pain Science Education RTC strengthening soft tissue mobilization. Treatment Plan of Care Effective Dates: 3/1/2021 TO 4/19/2021 (90 days).   Frequency/Duration: 2 times a week for 90 Days             Total Treatment Billable Duration: 36' Rx   PT Patient Time In/Time Out  Time In: 1430  Time Out: 8 [place] Daniel Carrasco DPT    Future Appointments   Date Time Provider Steve Galeana   3/5/2021  1:00 PM Gurdeep Aguayo DPT [facility]   3/8/2021  1:00 PM Gurdeep Aguayo DPT [facility]   3/11/2021  2:30 [facility]   3/15/2021  1:00 PM MILTON Ramirez [facility]   3/18/2021  2:15 PM Gurdeep Aguayo DPT [facility]

## 2021-03-05 ENCOUNTER — HOSPITAL ENCOUNTER (OUTPATIENT)
Dept: PHYSICAL THERAPY | Age: 56
Discharge: HOME | End: 2021-03-05
Payer: COMMERCIAL

## 2021-03-05 PROCEDURE — 97140 MANUAL THERAPY 1/> REGIONS: CPT

## 2021-03-05 NOTE — PROGRESS NOTES
Adrian Haynes  : 1965      Payor: Deya Cabrera / Plan: [place] [facility] / Product Type: PPO /    [address] at [address] [place], [id number] [place] [address] Road  [phone]   [phone]              OUTPATIENT PHYSICAL THERAPY:Progress Report: 3/5/2021 Visit Count:  10     ICD-10: Treatment Diagnosis:   Pain in Left Shoulder (M25.512)  Stiffness of Left Shoulder not elsewhere specified (M25.612)  Primary Osteoarthritis of Left Shoulder (M19.012)  Strain of muscle(s) and tendon(s) of the rotator cuff of Left Shoulder, sequela (N54.105L)              Precautions/Allergies:   Patient has no known allergies. Fall Risk Score: 1 (? 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 PROGRESS REPORT  Mr. Edmundo Murry has attended 10 visits including initial evaluation. Flexion is back to normal range; however, difficulty seen with abduction. He reports feeling tight---overall great compliance to both PT and HEP. Will continue PT efforts towards regaining abduction/scaption ROM. 1.  Arthroscopic resection of the distal clavicle  2. Biceps tenotomy  3. Subacromial decompression    *He is very limited with ER and abduction actively as well as has increased pain with any AROM. Note: he is only 5 days post op at this time! ACTIVE ROM (standing) LEFT   Shoulder Flexion 170 deg   Shoulder Abduction 90 deg   Shoulder Internal Rotation (Apley's) L1    Shoulder External Rotation (Apley's) C7     Passively, he does demonstrate tightness with ER. Shown gentle isometrics first session.        Patient will benefit from skilled physical therapy for manual therapeutic techniques (as appropriate), therapeutic exercises and activities, neuromuscular re-education, and comprehensive home exercises program to address current impairments and functional limitations. Rosanna Aceves will benefit from skilled PT (medically necessary) in order to address above deficits affecting participation in basic ADLs and overall functional tolerance. PROBLEM LIST (Impacting functional limitations):  · Decreased Strength  · Decreased ADL/Functional Activities  · Increased Pain  · Decreased Activity Tolerance  · Increased Shortness of Breath  · Decreased Flexibility/Joint Mobility  · Decreased [place] with Home Exercise Program INTERVENTIONS PLANNED:  1. Cold  2. Family Education  3. Home Exercise Program (HEP)  4. Manual Therapy  5. Neuromuscular Re-education/Strengthening  6. Range of Motion (ROM)  7. Therapeutic Activites  8. Therapeutic Exercise/Strengthening  9. Transfer Training  10. Ultrasound   TREATMENT PLAN:  Effective Dates: 3/5/2021 TO 4/19/2021 (90 days). Frequency/Duration: 2 times a week for 90 Days  GOALS: (Goals have been discussed and agreed upon with patient.)     Short-Term Goals~4 weeks  Goal Met   1. Rosanna Aceves will report <=1/10 pain with don/doffing clothing as well as minimal/no difficulty. 1.  [x] Date: GOAL MET 3/5/2021    2. Rosanna Aceves will demonstrate improvement in active shoulder scaption/flexion to >155 degrees to increase UE function and participation in ADLs. 2.  [x] Date:GOAL MET 3/5/2021    3. Rosanna Aceves will demonstrate demonstrate improvement in active shoulder abduction to >155 degrees to increase UE function and participation in ADLs. 3.  [] Date:   4. Rosanna Aceves will show a greater than 8 point decrease on the DASH in order to show an increase in upper extremity function. 4.  [] Date:   5. Rosanna Aceves will be independent in all HEP 5.   [x] Date: GOAL MET 3/5/2021 [address] will be able to reach backwards and put on seatbelt without difficulty. 6.  [x] Date: GOAL MET 3/5/2021          Long Term Goals~8 weeks Goal Met   1. Zelda Danielle will show full ROM of the UE in order to return to full functional mobility  1. [] Date:   2. Zelda Danielle will show a greater than 15 point decrease on the DASH in order to show an increase in upper extremity function 2. [] Date:   3. Zelda Danielle will report doing hair/bathing without difficulty and <=2/10 pain in order to be independent with ADL's 3.  [] Date:   4. Zelda Danielle will be independent in all advanced HEP 4.  [] Date:            Outcome Measure: Tool Used: Disabilities of the Arm, Shoulder and Hand (DASH) Questionnaire - Quick Version  Score:  Initial: 45/55  Most Recent: X/55 (Date: -- )   Interpretation of Score: The DASH is designed to measure the activities of daily living in person's with upper extremity dysfunction or pain. Each section is scored on a 1-5 scale, 5 representing the greatest disability. The scores of each section are added together for a total score of 55. Medical Necessity:   · Skilled intervention continues to be required due to deficits and impairments seen upon initial evaluation affecting patient's participation in ADLs and functional tasks. Reason for Services/Other Comments:  · Patient continues to require skilled intervention due to deficits and impairments seen upon initial evaluation affecting patient's participation in ADLs and functional tasks. Total Treatment Duration:  PT Patient Time In/Time Out  Time In: 1300  Time Out: 1342    Rehabilitation Potential For Stated Goals: good  Regarding Antonio Gaston's therapy, I certify that the treatment plan above will be carried out by a therapist or under their direction.   Thank you for this referral,  Ragini Esqueda DPT     Referring Physician Signature: Selma Dupree MD EXAMINATION:   Observation/Orthostatic Postural Assessment: Forward Head and Rounded Shoulders  Palpation:          Pain to anterior shoulder and incision sites. ROM:    AROM/PROM         Joint: Eval Date: 3/5/2021  Re-Assess Date:  Re-Assess Date:    ACTIVE ROM (standing) RIGHT LEFT RIGHT LEFT RIGHT LEFT   Shoulder Flexion 180  130 deg   170 deg       Shoulder Abduction 180 deg with twinge 60 deg   90 deg       Shoulder Internal Rotation (Apley's) T5 L1            Shoulder External Rotation (Apley's) T3 Top of head     C7       Elbow ROM             PASSIVE ROM (supine)             Shoulder Flexion  160 deg            Shoulder Abduction  153 deg            Shoulder Internal Rotation  70 deg           Shoulder External Rotation  30 deg  [facility]                                               Strength:    Joint: Eval Date: 3/5/2021  Re-Assess Date:  Re-Assess Date:     RIGHT LEFT RIGHT LEFT RIGHT LEFT   Shoulder Flexion 5/5 3+/5    4+/5       Shoulder Abduction  (C5) 5/5 4-/5    4/5       Shoulder Internal Rotation 5/5 4/5    4+/5       Shoulder External Rotation 5/5 3+/5    4+5       Elbow Flexion  (C6) 5/5 4-/5    4+/5       Elbow Extension (C7) 5/5 4+/5           Wrist Flexion (C7) 5/5 5/5           Wrist Extension (C6)             Resisted Thumb Extension/Finger Abduction (C8/T1)             Resisted Cervical Rotation (C1):             Resisted Shoulder Shrug (C2, 3, 4):               Strength                                                        Functional Mobility:  Assessed @ Initial Visit  -- Limited OH movement. See associated treatment note for treatment provided today.     Future Appointments   Date Time Provider Steve Galeana   3/8/2021  1:00 PM Zhang Bolden DPT [facility]   3/11/2021  2:30 PM Zhang Bolden DPT [facility]   [facility]15/2021  [facility]   [facility]18[facility]21  [facility]         Harpreet Epps DPT

## 2021-03-05 NOTE — PROGRESS NOTES
Tato Martin  : 1965  Payor: Marisol Renae / Plan: [facility] / Product Type: O /  [address] [facility] at [address] [place] [address]. Alverto Tavarez, [address] [place] [address]  [phone]   [phone]                                                          Jacqui Hardin MD      OUTPATIENT PHYSICAL THERAPY: Daily Treatment Note 3/5/2021 Visit Count:  12    Tx Diagnosis:    Pain in Left Shoulder (M25.512)  Stiffness of Left Shoulder not elsewhere specified (M25.612)  Primary Osteoarthritis of Left Shoulder (M19.012)  Strain of muscle(s) and tendon(s) of the rotator cuff of Left Shoulder, sequela (S46.012S)           Pre-treatment Symptoms/Complaints:  See Initial Eval Dated 21 for more details. \"I got caught by a frontage road sorry I'm late. \"     Current ROM:   ACTIVE ROM (standing) RIGHT LEFT   Shoulder Flexion 180  130 deg   Shoulder Abduction 180 deg with twinge 60 deg   Shoulder Internal Rotation (Apley's) T5 L1    Shoulder External Rotation (Apley's) T3 Top of head    Elbow ROM     PASSIVE ROM (supine)     Shoulder Flexion  160 deg    Shoulder Abduction  153 deg    Shoulder Internal Rotation  70 deg   Shoulder External Rotation  30 deg          Pain: Initial:2/10 Post Session: 1/10   Medications Last Reviewed:  3/5/2021     Updated Objective Findings: See Initial Eval for more details. TREATMENT:   THERAPEUTIC EXERCISE: (3 minutes):  Exercises per grid below to improve mobility, strength and balance. Required minimal visual, verbal and manual cues to promote proper body alignment and promote proper body posture. Progressed resistance and complexity of movement as indicated.      Date:  Eval Date:  21 Date:  21 Date:  21 Date:  21 Date:  21 Date:  21 DATE:  2.15.21 Date:  21 Date:  21 Date:  3.1.21 Date  3/5/21   Activity/Exercise Parameters Parameters Parameters            Education HEP, POC, PT goals, anatomy/pathology              Isometrics: ER, IR, Flexion, Abduction 10x10\" 10x10\"             UBE  3 min each direction level 1 3 min each direction level 1 4 minutes E direction level 1 4 minutes backwards, 4 minutes forward level 2.0 4 minutes backwards, 4 minutes forward level 2.0 4 minutes backwards, 4 minutes forward level 2.0 4 minutes backwards, 4 minutes forward level 2.0 4/4 level 2 Level 4.5 4/4 Level 4.5 4/4 Level 4.5 1 min forward, 2 minutes backwards   Wand flexion/abduction  Shown how to perform for HEP             Wall slides  10x 10x Flexion, abduction. 10x flexion  10x abduction  10x flexion  10x abduction 10x Flexion  10x Abduction 10x Flexion  10x Abduction 10x Flexion  10x Abduction 10x Flexion  10x Abduction  Manual assist abduction     AROM   10X 10x scaption,  10x snow angels    10x scaption  10x snow angels 10x scaption  10x snow angels 10x scaption  10x snow angels   10x scaption  10x snow angels 10x scaption  10x snow angels 10x scaption  10x snow angls 10x2  10x2 e TrX Rows   20x      Rows  Blue 10x10\" Black 10x10\" Black 10x10\" 23# 3x10 23# 3x10 23# 3x10 23# 3x10 23# 3x10 TrX Rows   20x   TrX Rows   20x      Extension TB  Blue 10x10\" Black 10x10\" Black 10x10\" 13# 2x10  13# 2x10 13# 2x10 13# 2x10 13# 2x10 Black 10x10\"     ER TB  Blue 10x10\" Black 10x10\" Black 10x10\" Black 10x10\" Black 10x10\" Black 10x10\" Black 10x10\"  Black 10x10\"  Black 10x10\"     IR TB  Blue 10x10\" Black 10x10\" Black 10x10\" Black 10x10\" Black 10x10\"  Black 10x10\" Black 10x10\"  Black 10x10\"  Black 10x10     Shoulder taps  20x 20x 20x 20x 25x 25x 25x 25x 25x     Clocks   *---   Blue 5x10\" Blue 5x10\" Blue 5x10\"  Blue 5x10\" Blue 10x10\"     Shrugs   20x 5#  20x 5# 20x 7# 20 x 7# 20x 7# 20x 7# 20x 7#      Push up plus   10x  10x 10x2 10x2 10x2 10x2 10x2 10x2     Walking     4 minutes.  4 minuutes          All Fours       L stabilizing  L stabilizing 10x10\" L stabilizing 10x10\" Plank 10x10\" Plank 10x10\"    Ring flexion 10x      ER 90 and up          10x blue     SL ER          10x      Pulleys           Abduction 4 minutes          MANUAL THERAPY: (-8 minutes): Joint mobilization, Soft tissue mobilization was utilized and necessary because of the patient's restricted joint motion and restricted motion of soft tissue mobility. Date  3/5/2021    Technique Used Grade  Level # Time(s) Effect while being performed   PROM    L all planes   STM incision sites       Inferior mobs IV L GH                                       MedBridge Portal  Treatment/Session Summary:   Ronny Vasquez - Due to him arriving late, session abbreviated and performed manual to improve ROM. Response to Treatment: Pt demonstrated understanding of POC and initial HEP. No increase in pain or adverse reactions. Communication/Consultation:  POC, HEP, PT goals, Faxed initial evaluation to MD.   Equipment provided today: HEP Handout     Recommendations/Intent for next treatment session:   Next visit will focus on Pain Science Education RTC strengthening soft tissue mobilization. Treatment Plan of Care Effective Dates: 3/5/2021 TO 4/19/2021 (90 days).   Frequency/Duration: 2 times a week for 90 Days             Total Treatment Billable Duration: 6' Rx   PT Patient Time In/Time Out  Time In: 0636  Time Out: 5360 Heriberto Keen, DPT    Future Appointments   Date Time Provider Steve Galeana   3/8/2021  1:00 PM Severo Moron, DPT [facility]   3/11/2021  2:30 PM Severo Moron, DPT [facility]   3/15/2021  1:00 PM [facility]   3/18/2021  2:15 PM Severo Moron, [facility]

## 2021-03-08 ENCOUNTER — HOSPITAL ENCOUNTER (OUTPATIENT)
Dept: PHYSICAL THERAPY | Age: 56
Discharge: HOME | End: 2021-03-08
Payer: COMMERCIAL

## 2021-03-08 PROCEDURE — 97110 THERAPEUTIC EXERCISES: CPT

## 2021-03-08 PROCEDURE — 97140 MANUAL THERAPY 1/> REGIONS: CPT

## 2021-03-08 NOTE — PROGRESS NOTES
Angi Northern  : 1965  Payor: Harper Gorman / Plan: [facility] / Product Type: PPO /  [address] [facility], [address] [place] [address], [address] [place] [address]  [phone]   [phone]                                                          Jack Barahona MD      OUTPATIENT PHYSICAL THERAPY: Daily Treatment Note 3/8/2021 Visit Count:  13    Tx Diagnosis:    Pain in Left Shoulder (M25.512)  Stiffness of Left Shoulder not elsewhere specified (M25.612)  Primary Osteoarthritis of Left Shoulder (M19.012)  Strain of muscle(s) and tendon(s) of the rotator cuff of Left Shoulder, sequela (S46.012S)           Pre-treatment Symptoms/Complaints:  See Initial Eval Dated 21 for more details. \"That stretching you did last session really helped out. \"     Current ROM:   ACTIVE ROM (standing) RIGHT LEFT   Shoulder Flexion 180  130 deg   Shoulder Abduction 180 deg with twinge 60 deg   Shoulder Internal Rotation (Apley's) T5 L1    Shoulder External Rotation (Apley's) T3 Top of head    Elbow ROM     PASSIVE ROM (supine)     Shoulder Flexion  160 deg    Shoulder Abduction  153 deg    Shoulder Internal Rotation  70 deg   Shoulder External Rotation  30 deg          Pain: Initial:2/10 Post Session: 1/10   Medications Last Reviewed:  3/8/2021     Updated Objective Findings: See Initial Eval for more details. TREATMENT:   THERAPEUTIC EXERCISE: (25 minutes):  Exercises per grid below to improve mobility, strength and balance. Required minimal visual, verbal and manual cues to promote proper body alignment and promote proper body posture. Progressed resistance and complexity of movement as indicated.      Date:  Eval Date:  21 Date:  21 Date:  21 Date:  21 Date:  21 Date:  21 DATE:  2.15.21 Date:  21 Date:  21 Date:  3.1.21 Date  3/5/21 Date:  3.8.21   Activity/Exercise Parameters Parameters Parameters Education HEP, POC, PT goals, anatomy/pathology               Isometrics: ER, IR, Flexion, Abduction 10x10\" 10x10\"              UBE  3 min each direction level 1 3 min each direction level 1 4 minutes E direction level 1 4 minutes backwards, 4 minutes forward level 2.0 4 minutes backwards, 4 minutes forward level 2.0 4 minutes backwards, 4 minutes forward level 2.0 4 minutes backwards, 4 minutes forward level 2.0 4/4 level 2 Level 4.5 4/4 Level 4.5 4/4 Level 4.5 1 min forward, 2 minutes backwards Level 4.5, 4 minutes each   Wand flexion/abduction  Shown how to perform for HEP              Wall slides  10x 10x Flexion, abduction. 10x flexion  10x abduction  10x flexion  10x abduction 10x Flexion  10x Abduction 10x Flexion  10x Abduction 10x Flexion  10x Abduction 10x Flexion  10x Abduction  Manual assist abduction      AROM   10X 10x scaption,  10x snow angels    10x scaption  10x snow angels 10x scaption  10x snow angels 10x scaption  10x snow angels   10x scaption  10x snow angels 10x scaption  10x snow angels 10x scaption  10x snow angls 10x2  10x2 e TrX Rows   20x    TrX rows 20x   Rows  Blue 10x10\" Black 10x10\" Black 10x10\" 23# 3x10 23# 3x10 23# 3x10 23# 3x10 23# 3x10 TrX Rows   20x   TrX Rows   20x    TrX Rows   20x   Extension TB  Blue 10x10\" Black 10x10\" Black 10x10\" 13# 2x10  13# 2x10 13# 2x10 13# 2x10 13# 2x10 Black 10x10\"   Black 10x10   ER TB  Blue 10x10\" Black 10x10\" Black 10x10\" Black 10x10\" Black 10x10\" Black 10x10\" Black 10x10\"  Black 10x10\"  Black 10x10\"   Black 10x10   IR TB  Blue 10x10\" Black 10x10\" Black 10x10\" Black 10x10\" Black 10x10\"  Black 10x10\" Black 10x10\"  Black 10x10\"  Black 10x10   Black 10x10   Shoulder taps  20x 20x 20x 20x 25x 25x 25x 25x 25x      Clocks   *---   Blue 5x10\" Blue 5x10\" Blue 5x10\"  Blue 5x10\" Blue 10x10\"      Shrugs   20x 5#  20x 5# 20x 7# 20 x 7# 20x 7# 20x 7# 20x 7#       Push up plus   10x  10x 10x2 10x2 10x2 10x2 10x2 10x2      Walking     4 minutes.  4 minuutes All Fours       L stabilizing  L stabilizing 10x10\" L stabilizing 10x10\" Plank 10x10\" Plank 10x10\"  Plank 10x10\"   Ring flexion           10x    10x   ER 90 and up          10x blue      SL ER          10x       Pulleys           Abduction 4 minutes  Abduction 4 minutes         MANUAL THERAPY: (15 minutes): Joint mobilization, Soft tissue mobilization was utilized and necessary because of the patient's restricted joint motion and restricted motion of soft tissue mobility. Date  3/8/2021    Technique Used Grade  Level # Time(s) Effect while being performed   PROM    L all planes   STM incision sites       Inferior mobs IV L GH                                       MedBridge Portal  Treatment/Session Summary:   Isrrael Phoenix - Due to positive results from last session with manual, we continued this during session today. ROM is limiting factor at this time - primarily abduction/scaption. Response to Treatment: Pt demonstrated understanding of POC and initial HEP. No increase in pain or adverse reactions. Communication/Consultation:  POC, HEP, PT goals, Faxed initial evaluation to MD.   Equipment provided today: HEP Handout     Recommendations/Intent for next treatment session:   Next visit will focus on Pain Science Education RTC strengthening soft tissue mobilization. Treatment Plan of Care Effective Dates: 3/8/2021 TO 4/19/2021 (90 days).   Frequency/Duration: 2 times a week for 90 Days             Total Treatment Billable Duration: 36' rx   PT Patient Time In/Time Out  Time In: 1300  Time Out: [address] [facility] Flores Brown DPT    Future Appointments   Date Time Provider Steve Galeana   3/11/2021  2:30 PM Catie Holguin DPT [facility]   3/15/2021  1:00 PM MILTON Rhodes [facility]   3/18/2021  2:15 PM MILTON Rhodes [facility]

## 2021-03-11 ENCOUNTER — HOSPITAL ENCOUNTER (OUTPATIENT)
Dept: PHYSICAL THERAPY | Age: 56
Discharge: HOME | End: 2021-03-11
Payer: COMMERCIAL

## 2021-03-11 PROCEDURE — 97110 THERAPEUTIC EXERCISES: CPT

## 2021-03-11 PROCEDURE — 97140 MANUAL THERAPY 1/> REGIONS: CPT

## 2021-03-11 NOTE — PROGRESS NOTES
Senait Aguilar  : 1965  Payor: Christiano Overall / Plan: [facility] / Product Type: O /  [address] [facility] at [address] [place] [address]. [facility], [address] [place] [address]  [phone]   [phone]                                                          Brayan Lima MD      OUTPATIENT PHYSICAL THERAPY: Daily Treatment Note 3/11/2021 Visit Count:  14    Tx Diagnosis:    Pain in Left Shoulder (M25.512)  Stiffness of Left Shoulder not elsewhere specified (M25.612)  Primary Osteoarthritis of Left Shoulder (M19.012)  Strain of muscle(s) and tendon(s) of the rotator cuff of Left Shoulder, sequela (S46.012S)           Pre-treatment Symptoms/Complaints:  See Initial Eval Dated 21 for more details. \"The rom is still pretty bad, but it's better. \"     Current ROM:   ACTIVE ROM (standing) RIGHT LEFT   Shoulder Flexion 180  130 deg   Shoulder Abduction 180 deg with twinge 60 deg   Shoulder Internal Rotation (Apley's) T5 L1    Shoulder External Rotation (Apley's) T3 Top of head    Elbow ROM     PASSIVE ROM (supine)     Shoulder Flexion  160 deg    Shoulder Abduction  153 deg    Shoulder Internal Rotation  70 deg   Shoulder External Rotation  30 deg          Pain: Initial:2/10 Post Session: 1/10   Medications Last Reviewed:  3/11/2021     Updated Objective Findings: See Initial Eval for more details. TREATMENT:   THERAPEUTIC EXERCISE: (25 minutes):  Exercises per grid below to improve mobility, strength and balance. Required minimal visual, verbal and manual cues to promote proper body alignment and promote proper body posture. Progressed resistance and complexity of movement as indicated.      Date:  Eval Date:  21 Date:  21 Date:  21 Date:  21 Date:  21 Date:  21 DATE:  2.15.21 Date:  21 Date:  21 Date:  3.1.21 Date  3/5/21 Date:  3.8.21 Date:  3.11.21   Activity/Exercise Parameters Parameters Parameters Education HEP, POC, PT goals, anatomy/pathology                Isometrics: ER, IR, Flexion, Abduction 10x10\" 10x10\"               UBE  3 min each direction level 1 3 min each direction level 1 4 minutes E direction level 1 4 minutes backwards, 4 minutes forward level 2.0 4 minutes backwards, 4 minutes forward level 2.0 4 minutes backwards, 4 minutes forward level 2.0 4 minutes backwards, 4 minutes forward level 2.0 4/4 level 2 Level 4.5 4/4 Level 4.5 4/4 Level 4.5 1 min forward, 2 minutes backwards Level 4.5, 4 minutes each Level 4.5, 4 minutes each   Wand flexion/abduction  Shown how to perform for HEP               Wall slides  10x 10x Flexion, abduction.   10x flexion  10x abduction  10x flexion  10x abduction 10x Flexion  10x Abduction 10x Flexion  10x Abduction 10x Flexion  10x Abduction 10x Flexion  10x Abduction  Manual assist abduction       AROM   10X 10x scaption,  10x snow angels    10x scaption  10x snow angels 10x scaption  10x snow angels 10x scaption  10x snow angels   10x scaption  10x snow angels 10x scaption  10x snow angels 10x scaption  10x snow angls 10x2  10x2 e TrX Rows   20x        Rows  Blue 10x10\" Black 10x10\" Black 10x10\" 23# 3x10 23# 3x10 23# 3x10 23# 3x10 23# 3x10 TrX Rows   20x   TrX Rows   20x    TrX Rows   20x TrX Rows 20x   Extension TB  Blue 10x10\" Black 10x10\" Black 10x10\" 13# 2x10  13# 2x10 13# 2x10 13# 2x10 13# 2x10 Black 10x10\"   Black 10x10 Black 10x10   ER TB  Blue 10x10\" Black 10x10\" Black 10x10\" Black 10x10\" Black 10x10\" Black 10x10\" Black 10x10\"  Black 10x10\"  Black 10x10\"   Black 10x10 Black 10x10   IR TB  Blue 10x10\" Black 10x10\" Black 10x10\" Black 10x10\" Black 10x10\"  Black 10x10\" Black 10x10\"  Black 10x10\"  Black 10x10   Black 10x10 Black 10x10   Shoulder taps  20x 20x 20x 20x 25x 25x 25x 25x 25x       Clocks   *---   Blue 5x10\" Blue 5x10\" Blue 5x10\"  Blue 5x10\" Blue 10x10\"       Shrugs   20x 5#  20x 5# 20x 7# 20 x 7# 20x 7# 20x 7# 20x 7#        Push up plus   10x  10x 10x2 10x2 10x2 10x2 10x2 10x2       Walking     4 minutes. 4 minuutes            All Fours       L stabilizing  L stabilizing 10x10\" L stabilizing 10x10\" Plank 10x10\" Plank 10x10\"  Plank 10x10\" Plank 10x10\"   Ring flexion           10x    10x 12x   ER 90 and up          10x blue       SL ER          10x        Pulleys           Abduction 4 minutes  Abduction 4 minutes Abduction 4 minutes         MANUAL THERAPY: (15 minutes): Joint mobilization, Soft tissue mobilization was utilized and necessary because of the patient's restricted joint motion and restricted motion of soft tissue mobility. Date  3/11/2021    Technique Used Grade  Level # Time(s) Effect while being performed   PROM    L all planes   STM NOT TODAY       Inferior mobs IV L GH                                       MedBridge Portal  Treatment/Session Summary:   Starr Parikh - Manual techniques still very much needed for current ROM deficits and mobility. Response to Treatment: Pt demonstrated understanding of POC and initial HEP. No increase in pain or adverse reactions. Communication/Consultation:  POC, HEP, PT goals, Faxed initial evaluation to MD.   Equipment provided today: HEP Handout     Recommendations/Intent for next treatment session:   Next visit will focus on Pain Science Education RTC strengthening soft tissue mobilization. Treatment Plan of Care Effective Dates: 3/11/2021 TO 4/19/2021 (90 days).   Frequency/Duration: 2 times a week for 90 Days             Total Treatment Billable Duration: 36' rx   PT Patient Time In/Time Out  Time In: 1430  Time Out: [address] [place] Daniel Malloy DPT    Future Appointments   Date Time Provider Steve Galeana   3/15/2021  1:00 PM Keyur Renae DPT [facility] AND [facility]   3/18/2021  2:15 PM MILTON [facility]

## 2021-03-15 ENCOUNTER — HOSPITAL ENCOUNTER (OUTPATIENT)
Dept: PHYSICAL THERAPY | Age: 56
Discharge: HOME | End: 2021-03-15
Payer: COMMERCIAL

## 2021-03-15 PROCEDURE — 97110 THERAPEUTIC EXERCISES: CPT

## 2021-03-15 PROCEDURE — 97140 MANUAL THERAPY 1/> REGIONS: CPT

## 2021-03-15 NOTE — PROGRESS NOTES
Zelda Lolis  : 1965  Payor: Veena Pickering / Plan: [facility] / Product Type: PPO /  Rober Goldberg at [address] [facility] [place] [address]  [phone]   [phone]                                                          Selma Dupree MD      OUTPATIENT PHYSICAL THERAPY: Daily Treatment Note 3/15/2021 Visit Count:  15    Tx Diagnosis:    Pain in Left Shoulder (M25.512)  Stiffness of Left Shoulder not elsewhere specified (M25.612)  Primary Osteoarthritis of Left Shoulder (M19.012)  Strain of muscle(s) and tendon(s) of the rotator cuff of Left Shoulder, sequela (S46.012S)           Pre-treatment Symptoms/Complaints:  See Initial Eval Dated 21 for more details. \"I mowed the lawn over the weekend and I really think the cold weather makes it hurt. \"     Current ROM:   ACTIVE ROM (standing) RIGHT LEFT   Shoulder Flexion 180  130 deg   Shoulder Abduction 180 deg with twinge 60 deg   Shoulder Internal Rotation (Apley's) T5 L1    Shoulder External Rotation (Apley's) T3 Top of head    Elbow ROM     PASSIVE ROM (supine)     Shoulder Flexion  160 deg    Shoulder Abduction  153 deg    Shoulder Internal Rotation  70 deg   Shoulder External Rotation  30 deg          Pain: Initial:2/10 Post Session: 1/10   Medications Last Reviewed:  3/15/2021     Updated Objective Findings: See Initial Eval for more details. TREATMENT:   THERAPEUTIC EXERCISE: (25 minutes):  Exercises per grid below to improve mobility, strength and balance. Required minimal visual, verbal and manual cues to promote proper body alignment and promote proper body posture. Progressed resistance and complexity of movement as indicated.      Date:  Eval Date:  21 Date:  21 Date:  21 Date:  21 Date:  21 Date:  21 DATE:  2.15.21 Date:  21 Date:  21 Date:  3.1.21 Date  3/5/21 Date:  3.8.21 Date:  3.11.21 Date:  3.15.21 Activity/Exercise Parameters Parameters Parameters               Education HEP, POC, PT goals, anatomy/pathology                 Isometrics: ER, IR, Flexion, Abduction 10x10\" 10x10\"                UBE  3 min each direction level 1 3 min each direction level 1 4 minutes E direction level 1 4 minutes backwards, 4 minutes forward level 2.0 4 minutes backwards, 4 minutes forward level 2.0 4 minutes backwards, 4 minutes forward level 2.0 4 minutes backwards, 4 minutes forward level 2.0 4/4 level 2 Level 4.5 4/4 Level 4.5 4/4 Level 4.5 1 min forward, 2 minutes backwards Level 4.5, 4 minutes each Level 4.5, 4 minutes each Level 4.5, 4 minutes each   Wand flexion/abduction  Shown how to perform for HEP                Wall slides  10x 10x Flexion, abduction.   10x flexion  10x abduction  10x flexion  10x abduction 10x Flexion  10x Abduction 10x Flexion  10x Abduction 10x Flexion  10x Abduction 10x Flexion  10x Abduction  Manual assist abduction        AROM   10X 10x scaption,  10x snow angels    10x scaption  10x snow angels 10x scaption  10x snow angels 10x scaption  10x snow angels   10x scaption  10x snow angels 10x scaption  10x snow angels 10x scaption  10x snow angls 10x2  10x2 e TrX Rows   20x         Rows  Blue 10x10\" Black 10x10\" Black 10x10\" 23# 3x10 23# 3x10 23# 3x10 23# 3x10 23# 3x10 TrX Rows   20x   TrX Rows   20x    TrX Rows   20x TrX Rows 20x TrX 20x   Extension TB  Blue 10x10\" Black 10x10\" Black 10x10\" 13# 2x10  13# 2x10 13# 2x10 13# 2x10 13# 2x10 Black 10x10\"   Black 10x10 Black 10x10 Black 10x10   ER TB  Blue 10x10\" Black 10x10\" Black 10x10\" Black 10x10\" Black 10x10\" Black 10x10\" Black 10x10\"  Black 10x10\"  Black 10x10\"   Black 10x10 Black 10x10 Black 10x10   IR TB  Blue 10x10\" Black 10x10\" Black 10x10\" Black 10x10\" Black 10x10\"  Black 10x10\" Black 10x10\"  Black 10x10\"  Black 10x10   Black 10x10 Black 10x10 Black 10x10   Shoulder taps  20x 20x 20x 20x 25x 25x 25x 25x 25x        Clocks   *---   Blue 5x10\" Blue 5x10\" Blue 5x10\"  Blue 5x10\" Blue 10x10\"        Shrugs   20x 5#  20x 5# 20x 7# 20 x 7# 20x 7# 20x 7# 20x 7#         Push up plus   10x  10x 10x2 10x2 10x2 10x2 10x2 10x2        Walking     4 minutes. 4 minuutes             All Fours       L stabilizing  L stabilizing 10x10\" L stabilizing 10x10\" Plank 10x10\" Plank 10x10\"  Plank 10x10\" Plank 10x10\" [place] 10x10\"   Ring flexion           10x    10x 12x 12x   ER 90 and up          10x blue        SL ER          10x         Pulleys           Abduction 4 minutes  Abduction 4 minutes Abduction 4 minutes Abduction 4 minutes         MANUAL THERAPY: (15 minutes): Joint mobilization, Soft tissue mobilization was utilized and necessary because of the patient's restricted joint motion and restricted motion of soft tissue mobility. Date  3/15/2021    Technique Used Grade  Level # Time(s) Effect while being performed   PROM    L all planes   STM NOT TODAY       Inferior mobs IV L GH                                       MedBridge Portal  Treatment/Session Summary:   [address] [place] strengthening. Very stiff UE.  Joint capsule tightness present. Response to Treatment: Pt demonstrated understanding of POC and initial HEP. No increase in pain or adverse reactions. Communication/Consultation:  POC, HEP, PT goals, Faxed initial evaluation to MD.   Equipment provided today: HEP Handout     Recommendations/Intent for next treatment session:   Next visit will focus on Pain Science Education RTC strengthening soft tissue mobilization. Treatment Plan of Care Effective Dates: 3/15/2021 TO 4/19/2021 (90 days).   Frequency/Duration: 2 times a week for 90 Days             Total Treatment Billable Duration: 36' rx   PT Patient Time In/Time Out  Time In: 1300  Time Out: Jluis Gonsales DPT    Future Appointments   Date Time Provider Steve Galeana   3/18/2021  2:15 PM Zhang Bolden DPT [facility] AND [facility]

## 2021-03-18 ENCOUNTER — HOSPITAL ENCOUNTER (OUTPATIENT)
Dept: PHYSICAL THERAPY | Age: 56
Discharge: HOME | End: 2021-03-18
Payer: COMMERCIAL

## 2021-03-18 PROCEDURE — 97110 THERAPEUTIC EXERCISES: CPT

## 2021-03-18 PROCEDURE — 97140 MANUAL THERAPY 1/> REGIONS: CPT

## 2021-03-18 NOTE — PROGRESS NOTES
Román Oconnor  : 1965  Payor: Kaela Jim / Plan: [place] [facility] / Product Type: PPO /  [address] [facility], [address] [place] [address], [address] [place] [address]  [phone]   [phone]                                                          Travon Pugh MD      OUTPATIENT PHYSICAL THERAPY: Daily Treatment Note 3/18/2021 Visit Count:  16    Tx Diagnosis:    Pain in Left Shoulder (M25.512)  Stiffness of Left Shoulder not elsewhere specified (M25.612)  Primary Osteoarthritis of Left Shoulder (M19.012)  Strain of muscle(s) and tendon(s) of the rotator cuff of Left Shoulder, sequela (S46.012S)           Pre-treatment Symptoms/Complaints:    \"I did see the doctor and he knew also it was frozen shoulder. I did get a shot. \"     Current ROM:   ACTIVE ROM (standing) RIGHT LEFT   Shoulder Flexion 180  130 deg   Shoulder Abduction 180 deg with twinge 60 deg   Shoulder Internal Rotation (Apley's) T5 L1    Shoulder External Rotation (Apley's) T3 Top of head    Elbow ROM     PASSIVE ROM (supine)     Shoulder Flexion  160 deg    Shoulder Abduction  153 deg    Shoulder Internal Rotation  70 deg   Shoulder External Rotation  30 deg          Pain: Initial:2/10 Post Session: 1/10   Medications Last Reviewed:  3/18/2021     Updated Objective Findings: See Initial Eval for more details. TREATMENT:   THERAPEUTIC EXERCISE: (25 minutes):  Exercises per grid below to improve mobility, strength and balance. Required minimal visual, verbal and manual cues to promote proper body alignment and promote proper body posture. Progressed resistance and complexity of movement as indicated.      Date:  Eval Date:  21 Date:  21 Date:  21 Date:  21 Date:  21 Date:  21 DATE:  2.15.21 Date:  21 Date:  21 Date:  3.1.21 Date  3/5/21 Date:  3.8.21 Date:  3.11.21 Date:  3.15.21 Date:  3.18.21   Activity/Exercise Parameters Parameters Parameters                Education HEP, POC, PT goals, anatomy/pathology                  Isometrics: ER, IR, Flexion, Abduction 10x10\" 10x10\"                 UBE  3 min each direction level 1 3 min each direction level 1 4 minutes E direction level 1 4 minutes backwards, 4 minutes forward level 2.0 4 minutes backwards, 4 minutes forward level 2.0 4 minutes backwards, 4 minutes forward level 2.0 4 minutes backwards, 4 minutes forward level 2.0 4/4 level 2 Level 4.5 4/4 Level 4.5 4/4 Level 4.5 1 min forward, 2 minutes backwards Level 4.5, 4 minutes each Level 4.5, 4 minutes each Level 4.5, 4 minutes each Level 4.5, 4 minutes each   Wand flexion/abduction  Shown how to perform for HEP                 Wall slides  10x 10x Flexion, abduction.   10x flexion  10x abduction  10x flexion  10x abduction 10x Flexion  10x Abduction 10x Flexion  10x Abduction 10x Flexion  10x Abduction 10x Flexion  10x Abduction  Manual assist abduction         AROM   10X 10x scaption,  10x snow angels    10x scaption  10x snow angels 10x scaption  10x snow angels 10x scaption  10x snow angels   10x scaption  10x snow angels 10x scaption  10x snow angels 10x scaption  10x snow angls 10x2  10x2 e TrX Rows   20x          Rows  Blue 10x10\" Black 10x10\" Black 10x10\" 23# 3x10 23# 3x10 23# 3x10 23# 3x10 23# 3x10 TrX Rows   20x   TrX Rows   20x    TrX Rows   20x TrX Rows 20x TrX 20x Black 10x10\"   Extension TB  Blue 10x10\" Black 10x10\" Black 10x10\" 13# 2x10  13# 2x10 13# 2x10 13# 2x10 13# 2x10 Black 10x10\"   Black 10x10 Black 10x10 Black 10x10 Black 10x10\"   ER TB  Blue 10x10\" Black 10x10\" Black 10x10\" Black 10x10\" Black 10x10\" Black 10x10\" Black 10x10\"  Black 10x10\"  Black 10x10\"   Black 10x10 Black 10x10 Black 10x10 Black 10x10   IR TB  Blue 10x10\" Black 10x10\" Black 10x10\" Black 10x10\" Black 10x10\"  Black 10x10\" Black 10x10\"  Black 10x10\"  Black 10x10   Black 10x10 Black 10x10 Black 10x10 Black 10x10   Shoulder taps  20x 20x 20x 20x 25x 25x 25x 25x 25x         Clocks   *---   Blue 5x10\" Blue 5x10\" Blue 5x10\"  Blue 5x10\" Blue 10x10\"         Shrugs   20x 5#  20x 5# 20x 7# 20 x 7# 20x 7# 20x 7# 20x 7#          Push up plus   10x  10x 10x2 10x2 10x2 10x2 10x2 10x2         Walking     4 minutes. 4 minuutes              All Fours       L stabilizing  L stabilizing 10x10\" L stabilizing 10x10\" Plank 10x10\" Plank 10x10\"  Plank 10x10\" Plank 10x10\" [place] 10x10\" Single arm 10x10\"   Ring flexion           10x    10x 12x 12x 15x   ER 90 and up          10x blue         SL ER          10x          Pulleys           Abduction 4 minutes  Abduction 4 minutes Abduction 4 minutes Abduction 4 minutes          MANUAL THERAPY: (30 minutes): Joint mobilization, Soft tissue mobilization was utilized and necessary because of the patient's restricted joint motion and restricted motion of soft tissue mobility. Date  3/18/2021    Technique Used Grade  Level # Time(s) Effect while being performed   PROM all planes    L all planes   STM NOT TODAY       Inferior mobs IV L GH     Shoulder quadrant supine flopping fish IV+                                 MedBridge Portal  Treatment/Session Summary:   Phillip Jain - Progressed manual; however, did not push much past end range and focusing more on joint capular mobility. Able to execute single arm planks for scapular stabilization. Response to Treatment: Pt demonstrated understanding of POC and initial HEP. No increase in pain or adverse reactions. Communication/Consultation:  POC, HEP, PT goals, Faxed initial evaluation to MD.   Equipment provided today: HEP Handout     Recommendations/Intent for next treatment session:   Next visit will focus on Pain Science Education RTC strengthening soft tissue mobilization. Treatment Plan of Care Effective Dates: 3/18/2021 TO 4/19/2021 (90 days).   Frequency/Duration: 2 times a week for 90 Days             Total Treatment Billable Duration: 54' rx   PT Patient Time In/Time Out  Time In: 1425  Time Out: Ailyn Bustos DPT    Future Appointments   Date Time Provider Steve Reneei   3/24/2021  1:45 PM Yuba Vulcan, DPT [facility] [place] [facility]   3/30/2021  1:45 PM Kelvin Vulcan, DPT [facility]   4/1/2021  1:45 PM Yuba Vulcan, DPT [facility]   4/6/2021  1:45 PM Kelvin Vulcan, DPT [facility]   4/8/2021  1:45 PM Yuba Vulcan, DPT [facility]   4/13/2021  1:45 PM Yuba Vulcan, DPT [facility]   4/20/2021  1:45 PM Kelvin Vulcan, DPT [facility]   4/22/2021  1:45 PM Eklvin Vulcan, DPT [facility]

## 2021-03-24 ENCOUNTER — HOSPITAL ENCOUNTER (OUTPATIENT)
Dept: PHYSICAL THERAPY | Age: 56
Discharge: HOME | End: 2021-03-24
Payer: COMMERCIAL

## 2021-03-24 PROCEDURE — 97110 THERAPEUTIC EXERCISES: CPT

## 2021-03-24 PROCEDURE — 97140 MANUAL THERAPY 1/> REGIONS: CPT

## 2021-03-24 NOTE — PROGRESS NOTES
Lindsay Luis Eduardo  : 1965  Payor: [employer] / Plan: [facility] / Product Type: PPO /  Mohsen Alemaner at [address]. [facility], [address] [place] [address]  [phone]   [phone]                                                          More Cardoso MD      OUTPATIENT PHYSICAL THERAPY: Daily Treatment Note 3/24/2021 Visit Count:  17    Tx Diagnosis:    Pain in Left Shoulder (M25.512)  Stiffness of Left Shoulder not elsewhere specified (M25.612)  Primary Osteoarthritis of Left Shoulder (M19.012)  Strain of muscle(s) and tendon(s) of the rotator cuff of Left Shoulder, sequela (S46.012S)           Pre-treatment Symptoms/Complaints:    \"I am doing my pulleys about 4 times a day. \"     Current ROM:   ACTIVE ROM (standing) RIGHT LEFT   Shoulder Flexion 180  130 deg   Shoulder Abduction 180 deg with twinge 60 deg   Shoulder Internal Rotation (Apley's) T5 L1    Shoulder External Rotation (Apley's) T3 Top of head    Elbow ROM     PASSIVE ROM (supine)     Shoulder Flexion  160 deg    Shoulder Abduction  153 deg    Shoulder Internal Rotation  70 deg   Shoulder External Rotation  30 deg          Pain: Initial:2/10 Post Session: 1/10   Medications Last Reviewed:  3/24/2021     Updated Objective Findings: See Initial Eval for more details. TREATMENT:   THERAPEUTIC EXERCISE: (30 minutes):  Exercises per grid below to improve mobility, strength and balance. Required minimal visual, verbal and manual cues to promote proper body alignment and promote proper body posture. Progressed resistance and complexity of movement as indicated.      Date:  Eval Date:  21 Date:  21 Date:  21 Date:  21 Date:  21 Date:  21 DATE:  2.15.21 Date:  21 Date:  21 Date:  3.1.21 Date  3/5/21 Date:  3.8.21 Date:  3.11.21 Date:  3.15.21 Date:  3.18.21 Date:  3.24.21   Activity/Exercise Parameters Parameters Parameters Education HEP, POC, PT goals, anatomy/pathology                   Isometrics: ER, IR, Flexion, Abduction 10x10\" 10x10\"                  UBE  3 min each direction level 1 3 min each direction level 1 4 minutes E direction level 1 4 minutes backwards, 4 minutes forward level 2.0 4 minutes backwards, 4 minutes forward level 2.0 4 minutes backwards, 4 minutes forward level 2.0 4 minutes backwards, 4 minutes forward level 2.0 4/4 level 2 Level 4.5 4/4 Level 4.5 4/4 Level 4.5 1 min forward, 2 minutes backwards Level 4.5, 4 minutes each Level 4.5, 4 minutes each Level 4.5, 4 minutes each Level 4.5, 4 minutes each Level 5, 4 minutes each   Wand flexion/abduction  Shown how to perform for HEP                  Wall slides  10x 10x Flexion, abduction.   10x flexion  10x abduction  10x flexion  10x abduction 10x Flexion  10x Abduction 10x Flexion  10x Abduction 10x Flexion  10x Abduction 10x Flexion  10x Abduction  Manual assist abduction          AROM   10X 10x scaption,  10x snow angels    10x scaption  10x snow angels 10x scaption  10x snow angels 10x scaption  10x snow angels   10x scaption  10x snow angels 10x scaption  10x snow angels 10x scaption  10x snow angls 10x2  10x2 e TrX Rows   20x           Rows  Blue 10x10\" Black 10x10\" Black 10x10\" 23# 3x10 23# 3x10 23# 3x10 23# 3x10 23# 3x10 TrX Rows   20x   TrX Rows   20x    TrX Rows   20x TrX Rows 20x TrX 20x Black 10x10\" Black 10x10\"   Extension TB  Blue 10x10\" Black 10x10\" Black 10x10\" 13# 2x10  13# 2x10 13# 2x10 13# 2x10 13# 2x10 Black 10x10\"   Black 10x10 Black 10x10 Black 10x10 Black 10x10\" Black 10x10\"   ER TB  Blue 10x10\" Black 10x10\" Black 10x10\" Black 10x10\" Black 10x10\" Black 10x10\" Black 10x10\"  Black 10x10\"  Black 10x10\"   Black 10x10 Black 10x10 Black 10x10 Black 10x10 Black 10x10\"   IR TB  Blue 10x10\" Black 10x10\" Black 10x10\" Black 10x10\" Black 10x10\"  Black 10x10\" Black 10x10\"  Black 10x10\"  Black 10x10   Black 10x10 Black 10x10 Black 10x10 Black 10x10 Black 10x10\"   Shoulder taps  20x 20x 20x 20x 25x 25x 25x 25x 25x          Clocks   *---   Blue 5x10\" Blue 5x10\" Blue 5x10\"  Blue 5x10\" Blue 10x10\"          Shrugs   20x 5#  20x 5# 20x 7# 20 x 7# 20x 7# 20x 7# 20x 7#           Push up plus   10x  10x 10x2 10x2 10x2 10x2 10x2 10x2          Walking     4 minutes. 4 minuutes               All Fours       L stabilizing  L stabilizing 10x10\" L stabilizing 10x10\" Plank 10x10\" Plank 10x10\"  Plank 10x10\" Plank 10x10\" Marinell Pepper 10x10\" Single arm 10x10\" Single arm 10x10\"   Ring flexion           10x    10x 12x 12x 15x 1x   ER 90 and up          10x blue          SL ER          10x           Pulleys           Abduction 4 minutes  Abduction 4 minutes Abduction 4 minutes Abduction 4 minutes           MANUAL THERAPY: (10 minutes): Joint mobilization, Soft tissue mobilization was utilized and necessary because of the patient's restricted joint motion and restricted motion of soft tissue mobility. Date  3/24/2021    Technique Used Grade  Level # Time(s) Effect while being performed   PROM all planes    L all planes   STM NOT TODAY       Inferior mobs IV L GH     Shoulder quadrant supine flopping fish IV+                                 MedBridge Portal  Treatment/Session Summary:   True Kells - SHowing improvement with ROM, but very gradual.        Response to Treatment: Pt demonstrated understanding of POC and initial HEP. No increase in pain or adverse reactions. Communication/Consultation:  POC, HEP, PT goals, Faxed initial evaluation to MD.   Equipment provided today: HEP Handout     Recommendations/Intent for next treatment session:   Next visit will focus on Pain Science Education RTC strengthening soft tissue mobilization. Treatment Plan of Care Effective Dates: 3/24/2021 TO 4/19/2021 (90 days).   Frequency/Duration: 2 times a week for 90 Days             Total Treatment Billable Duration: 36' rx   PT Patient Time In/Time Out  Time In: 1345  Time Out: [facility] Vi Quinn, DPT    Future Appointments   Date Time Provider Steve Galeana   3/30/2021  1:45 PM Toña Hang, DPT [facility]   4/1/2021  1:45 PM Toña Hang, DPT [facility]   4/6/2021  1:45 PM Toña Hang, DPT [facility]   4/8/2021  1:45 PM Toña Hang, DPT [facility]   4/13/2021  1:45 PM Toña Hang, DPT [facility]   4/20/2021  1:45 PM Toña Hang, DPT [facility]   4/22/2021  1:45 PM Toña Hang, DPT [facility]

## 2021-03-30 ENCOUNTER — HOSPITAL ENCOUNTER (OUTPATIENT)
Dept: PHYSICAL THERAPY | Age: 56
Discharge: HOME | End: 2021-03-30
Payer: COMMERCIAL

## 2021-03-30 PROCEDURE — 97110 THERAPEUTIC EXERCISES: CPT

## 2021-03-30 PROCEDURE — 97140 MANUAL THERAPY 1/> REGIONS: CPT

## 2021-03-30 NOTE — PROGRESS NOTES
Walter Maza  : 1965  Payor: Gail Fuelling / Plan: [facility] / Product Type: PPO /  Dorota Sethi at [address] [facility]. [facility], [address] [place] [address], [address] [place] [address]  [phone]   [phone]                                                          Micih Tejada MD      OUTPATIENT PHYSICAL THERAPY: Daily Treatment Note 3/30/2021 Visit Count:  18    Tx Diagnosis:    Pain in Left Shoulder (M25.512)  Stiffness of Left Shoulder not elsewhere specified (M25.612)  Primary Osteoarthritis of Left Shoulder (M19.012)  Strain of muscle(s) and tendon(s) of the rotator cuff of Left Shoulder, sequela (S46.012S)           Pre-treatment Symptoms/Complaints:    \"*I feel like my shoulder is not hiking up at much--doesn't hurt as much. \"     Current ROM:   ACTIVE ROM (standing) RIGHT LEFT   Shoulder Flexion 180  130 deg   Shoulder Abduction 180 deg with twinge 60 deg   Shoulder Internal Rotation (Apley's) T5 L1    Shoulder External Rotation (Apley's) T3 Top of head    Elbow ROM     PASSIVE ROM (supine)     Shoulder Flexion  160 deg    Shoulder Abduction  153 deg    Shoulder Internal Rotation  70 deg   Shoulder External Rotation  30 deg          Pain: Initial:2/10 Post Session: 1/10   Medications Last Reviewed:  3/30/2021     Updated Objective Findings: See Initial Eval for more details. TREATMENT:   THERAPEUTIC EXERCISE: (25 minutes):  Exercises per grid below to improve mobility, strength and balance. Required minimal visual, verbal and manual cues to promote proper body alignment and promote proper body posture. Progressed resistance and complexity of movement as indicated.      Date:  Eval Date:  21 Date:  21 Date:  21 Date:  21 Date:  21 Date:  21 DATE:  2.15.21 Date:  21 Date:  21 Date:  3.1.21 Date  3/5/21 Date:  3.8.21 Date:  3.11.21 Date:  3.15.21 Date:  3.18.21 Date:  3.24.21 Date:  3.30.21 Date:     Activity/Exercise Parameters Parameters Parameters                   Education HEP, POC, PT goals, anatomy/pathology                     Isometrics: ER, IR, Flexion, Abduction 10x10\" 10x10\"                    UBE  3 min each direction level 1 3 min each direction level 1 4 minutes E direction level 1 4 minutes backwards, 4 minutes forward level 2.0 4 minutes backwards, 4 minutes forward level 2.0 4 minutes backwards, 4 minutes forward level 2.0 4 minutes backwards, 4 minutes forward level 2.0 4/4 level 2 Level 4.5 4/4 Level 4.5 4/4 Level 4.5 1 min forward, 2 minutes backwards Level 4.5, 4 minutes each Level 4.5, 4 minutes each Level 4.5, 4 minutes each Level 4.5, 4 minutes each Level 5, 4 minutes each Level 5, 4 minutes each Level 5, 4 minutes each   Wand flexion/abduction  Shown how to perform for HEP                    Wall slides  10x 10x Flexion, abduction.   10x flexion  10x abduction  10x flexion  10x abduction 10x Flexion  10x Abduction 10x Flexion  10x Abduction 10x Flexion  10x Abduction 10x Flexion  10x Abduction  Manual assist abduction            AROM   10X 10x scaption,  10x snow angels    10x scaption  10x snow angels 10x scaption  10x snow angels 10x scaption  10x snow angels   10x scaption  10x snow angels 10x scaption  10x snow angels 10x scaption  10x snow angls 10x2  10x2 e TrX Rows   20x             Rows  Blue 10x10\" Black 10x10\" Black 10x10\" 23# 3x10 23# 3x10 23# 3x10 23# 3x10 23# 3x10 TrX Rows   20x   TrX Rows   20x    TrX Rows   20x TrX Rows 20x TrX 20x Black 10x10\" Black 10x10\"     Extension TB  Blue 10x10\" Black 10x10\" Black 10x10\" 13# 2x10  13# 2x10 13# 2x10 13# 2x10 13# 2x10 Black 10x10\"   Black 10x10 Black 10x10 Black 10x10 Black 10x10\" Black 10x10\"     ER TB  Blue 10x10\" Black 10x10\" Black 10x10\" Black 10x10\" Black 10x10\" Black 10x10\" Black 10x10\"  Black 10x10\"  Black 10x10\"   Black 10x10 Black 10x10 Black 10x10 Black 10x10 Black 10x10\" Blue  10x10\"    IR TB  Blue 10x10\" Black 10x10\" Black 10x10\" Black 10x10\" Black 10x10\"  Black 10x10\" Black 10x10\"  Black 10x10\"  Black 10x10   Black 10x10 Black 10x10 Black 10x10 Black 10x10 Black 10x10\" Blue   10x10\"    Shoulder taps  20x 20x 20x 20x 25x 25x 25x 25x 25x            Clocks   *---   Blue 5x10\" Blue 5x10\" Blue 5x10\"  Blue 5x10\" Blue 10x10\"            Shrugs   20x 5#  20x 5# 20x 7# 20 x 7# 20x 7# 20x 7# 20x 7#             Push up plus   10x  10x 10x2 10x2 10x2 10x2 10x2 10x2            Walking     4 minutes. 4 minuutes                 All Fours       L stabilizing  L stabilizing 10x10\" L stabilizing 10x10\" Plank 10x10\" Plank 10x10\"  Plank 10x10\" Plank 10x10\" [place] 10x10\" Single arm 10x10\" Single arm 10x10\"     Ring flexion           10x    10x 12x 12x 15x 1x     ER 90 and up          10x blue            SL ER          10x             Pulleys           Abduction 4 minutes  Abduction 4 minutes Abduction 4 minutes Abduction 4 minutes       Scaption                  3# 2x10    Bent over row                  10# 2x10 5\"     Bent over shoulder extension                  4# 10x2                                MANUAL THERAPY: (15 minutes): Joint mobilization, Soft tissue mobilization was utilized and necessary because of the patient's restricted joint motion and restricted motion of soft tissue mobility. Date  3/30/2021    Technique Used Grade  Level # Time(s) Effect while being performed   PROM all planes    L all planes   STM NOT TODAY       Inferior mobs IV L GH     Shoulder quadrant supine flopping fish IV+                                 MedBridge Portal  Treatment/Session Summary:   Mara Suarez - Continued to work on ROM - scapular mobility is improved. Added bent over ext and rows to enhance scapular strength and improve ROM. Response to Treatment: Pt demonstrated understanding of POC and initial HEP. No increase in pain or adverse reactions.    Communication/Consultation:  POC, HEP, PT goals, Faxed initial evaluation to MD.   Equipment provided today: HEP Handout     Recommendations/Intent for next treatment session:   Next visit will focus on Pain Science Education RTC strengthening soft tissue mobilization. Treatment Plan of Care Effective Dates: 3/30/2021 TO 4/19/2021 (90 days).   Frequency/Duration: 2 times a week for 90 Days             Total Treatment Billable Duration: 36' rx   PT Patient Time In/Time Out  Time In: 1345  Time Out: [address] Greg Encarnacion DPT    Future Appointments   Date Time Provider Steve Galeana   4/1/2021  1:45 PM Lewanda Pool, DPT [facility]   4/6/2021  1:45 PM Lewanda Pool, DPT [facility]   4/8/2021  1:45 PM Lewanda Pool, DPT [facility]   4/13/2021  1:45 PM Lewanda Pool, DPT [facility]   4/20/2021  1:45 PM Lewanda Pool, DPT [facility]   4/22/2021  1:45 PM Lewanda Pool, DPT [facility]

## 2021-04-01 ENCOUNTER — HOSPITAL ENCOUNTER (OUTPATIENT)
Dept: PHYSICAL THERAPY | Age: 56
Discharge: HOME | End: 2021-04-01
Payer: COMMERCIAL

## 2021-04-01 PROCEDURE — 97140 MANUAL THERAPY 1/> REGIONS: CPT

## 2021-04-01 PROCEDURE — 97110 THERAPEUTIC EXERCISES: CPT

## 2021-04-01 NOTE — PROGRESS NOTES
Daniele Cartagena  : 1965  Payor: Nicholas Linn / Plan: [facility] / Product Type: PPO /  Cal Mckeon at [address] [facility]. [address] [place] [address]  [phone]   [phone]                                                          Naina Hardin MD      OUTPATIENT PHYSICAL THERAPY: Daily Treatment Note 2021 Visit Count:  19    Tx Diagnosis:    Pain in Left Shoulder (M25.512)  Stiffness of Left Shoulder not elsewhere specified (M25.612)  Primary Osteoarthritis of Left Shoulder (M19.012)  Strain of muscle(s) and tendon(s) of the rotator cuff of Left Shoulder, sequela (S46.012S)           Pre-treatment Symptoms/Complaints:    \"*My shoulder feels pretty good--it doesn't feel like it's improving again. I see the Doctor next Wednesday\"     Current ROM:   ACTIVE ROM (standing) RIGHT LEFT   Shoulder Flexion 180  130 deg   Shoulder Abduction 180 deg with twinge 60 deg   Shoulder Internal Rotation (Apley's) T5 L1    Shoulder External Rotation (Apley's) T3 Top of head    Elbow ROM     PASSIVE ROM (supine)     Shoulder Flexion  160 deg    Shoulder Abduction  153 deg    Shoulder Internal Rotation  70 deg   Shoulder External Rotation  30 deg          Pain: Initial:2/10 Post Session: 1/10   Medications Last Reviewed:  2021     Updated Objective Findings: See Initial Eval for more details. TREATMENT:   THERAPEUTIC EXERCISE: (25 minutes):  Exercises per grid below to improve mobility, strength and balance. Required minimal visual, verbal and manual cues to promote proper body alignment and promote proper body posture. Progressed resistance and complexity of movement as indicated.      Date:  Eval Date:  21 Date:  21 Date:  21 Date:  21 Date:  21 Date:  21 DATE:  2.15.21 Date:  21 Date:  21 Date:  3.1.21 Date  3/5/21 Date:  3.8.21 Date:  3.11.21 Date:  3.15.21 Date:  3.18.21 Date:  3.24.21 Date:  3.30.21 Date:  4/1/21   Activity/Exercise Parameters Parameters Parameters                   Education HEP, POC, PT goals, anatomy/pathology                     Isometrics: ER, IR, Flexion, Abduction 10x10\" 10x10\"                    UBE  3 min each direction level 1 3 min each direction level 1 4 minutes E direction level 1 4 minutes backwards, 4 minutes forward level 2.0 4 minutes backwards, 4 minutes forward level 2.0 4 minutes backwards, 4 minutes forward level 2.0 4 minutes backwards, 4 minutes forward level 2.0 4/4 level 2 Level 4.5 4/4 Level 4.5 4/4 Level 4.5 1 min forward, 2 minutes backwards Level 4.5, 4 minutes each Level 4.5, 4 minutes each Level 4.5, 4 minutes each Level 4.5, 4 minutes each Level 5, 4 minutes each Level 5, 4 minutes each Level 5, 4 minutes each   Wand flexion/abduction  Shown how to perform for HEP                    Wall slides  10x 10x Flexion, abduction.   10x flexion  10x abduction  10x flexion  10x abduction 10x Flexion  10x Abduction 10x Flexion  10x Abduction 10x Flexion  10x Abduction 10x Flexion  10x Abduction  Manual assist abduction            AROM   10X 10x scaption,  10x snow angels    10x scaption  10x snow angels 10x scaption  10x snow angels 10x scaption  10x snow angels   10x scaption  10x snow angels 10x scaption  10x snow angels 10x scaption  10x snow angls 10x2  10x2 e TrX Rows   20x             Rows  Blue 10x10\" Black 10x10\" Black 10x10\" 23# 3x10 23# 3x10 23# 3x10 23# 3x10 23# 3x10 TrX Rows   20x   TrX Rows   20x    TrX Rows   20x TrX Rows 20x TrX 20x Black 10x10\" Black 10x10\"     Extension TB  Blue 10x10\" Black 10x10\" Black 10x10\" 13# 2x10  13# 2x10 13# 2x10 13# 2x10 13# 2x10 Black 10x10\"   Black 10x10 Black 10x10 Black 10x10 Black 10x10\" Black 10x10\"     ER TB  Blue 10x10\" Black 10x10\" Black 10x10\" Black 10x10\" Black 10x10\" Black 10x10\" Black 10x10\"  Black 10x10\"  Black 10x10\"   Black 10x10 Black 10x10 Black 10x10 Black 10x10 Black 10x10\" Blue  10x10\" ActualMeds 10x10\"   IR TB  Blue 10x10\" Black 10x10\" Black 10x10\" Black 10x10\" Black 10x10\"  Black 10x10\" Black 10x10\"  Black 10x10\"  Black 10x10   Black 10x10 Black 10x10 Black 10x10 Black 10x10 Black 10x10\" Blue   10x10\" Blue 10x10\"   Shoulder taps  20x 20x 20x 20x 25x 25x 25x 25x 25x            Clocks   *---   Blue 5x10\" Blue 5x10\" Blue 5x10\"  Blue 5x10\" Blue 10x10\"            Shrugs   20x 5#  20x 5# 20x 7# 20 x 7# 20x 7# 20x 7# 20x 7#             Push up plus   10x  10x 10x2 10x2 10x2 10x2 10x2 10x2            Walking     4 minutes. 4 minuutes                 All Fours       L stabilizing  L stabilizing 10x10\" L stabilizing 10x10\" Plank 10x10\" Plank 10x10\"  Plank 10x10\" Plank 10x10\" Serge Notch 10x10\" Single arm 10x10\" Single arm 10x10\"  *10x10\"     Single arm raise. Ring flexion           10x    10x 12x 12x 15x 1x     ER 90 and up          10x blue            SL ER          10x             Pulleys           Abduction 4 minutes  Abduction 4 minutes Abduction 4 minutes Abduction 4 minutes       Scaption                  3# 2x10 3# 3z10   Bent over row                  10# 2x10 5\"  10# 2x10 5\"   Bent over shoulder extension                  4# 10x2 4# 10x2   Clocks                            MANUAL THERAPY: (30 minutes): Joint mobilization, Soft tissue mobilization was utilized and necessary because of the patient's restricted joint motion and restricted motion of soft tissue mobility. Date  4/1/2021    Technique Used Grade  Level # Time(s) Effect while being performed   PROM all planes    L all planes   STM NOT TODAY       Inferior mobs IV L GH     Shoulder quadrant supine flopping fish IV+                                 MedBridge Portal  Treatment/Session Summary:   Matthew Callahan St more time on manual techniques as he is limited with joint capsular mobility (abduction/ER>flexion). Response to Treatment: Pt demonstrated understanding of POC and initial HEP. No increase in pain or adverse reactions. Communication/Consultation:  POC, HEP, PT goals, Faxed initial evaluation to MD.   Equipment provided today: HEP Handout     Recommendations/Intent for next treatment session:   Next visit will focus on Pain Science Education RTC strengthening soft tissue mobilization. Treatment Plan of Care Effective Dates: 4/1/2021 TO 4/19/2021 (90 days).   Frequency/Duration: 2 times a week for 90 Days             Total Treatment Billable Duration: 54' rx   PT Patient Time In/Time Out  Time In: 1347  Time Out: [facility], DPT    Future Appointments   Date Time Provider [facility]   4/6/2021  1:45 PM Mariajose Roberson, [facility]   4/8/2021  1:45 PM Mariajose Roberson, DPT [facility]   4/13/2021  1:45 PM Mariajose Roberson, DPT [facility]   4/20/2021  1:45 PM Mariajose Roberson, DPT [facility]   4/22/2021  1:45 PM Mariajose Roberson, DPT [facility]

## 2021-04-06 ENCOUNTER — HOSPITAL ENCOUNTER (OUTPATIENT)
Dept: PHYSICAL THERAPY | Age: 56
Discharge: HOME | End: 2021-04-06
Payer: COMMERCIAL

## 2021-04-06 PROCEDURE — 97110 THERAPEUTIC EXERCISES: CPT

## 2021-04-06 PROCEDURE — 97140 MANUAL THERAPY 1/> REGIONS: CPT

## 2021-04-06 NOTE — PROGRESS NOTES
Chayo Pradhan  : 1965  Payor: Tyra Gill / Plan: [place] [facility] / Product Type: PPO /  [id number] [facility], [address] [place] [address], [address] [place] [address]  [phone]   [phone]                                                          Parul Lam MD      OUTPATIENT PHYSICAL THERAPY: Daily Treatment Note 2021 Visit Count:  20    Tx Diagnosis:    Pain in Left Shoulder (M25.512)  Stiffness of Left Shoulder not elsewhere specified (M25.612)  Primary Osteoarthritis of Left Shoulder (M19.012)  Strain of muscle(s) and tendon(s) of the rotator cuff of Left Shoulder, sequela (S46.012S)           Pre-treatment Symptoms/Complaints: \"The shoulder is hurting the last couple of days--really tender on top of the shoulder. It didn't hurt after therapy but the next day it was sore. It did loosen it up\"        Pain: Initial:2/10 Post Session: 1/10   Medications Last Reviewed:  2021     Updated Objective Findings: See Initial Eval for more details. TREATMENT:   THERAPEUTIC EXERCISE: (15 minutes):  Exercises per grid below to improve mobility, strength and balance. Required minimal visual, verbal and manual cues to promote proper body alignment and promote proper body posture. Progressed resistance and complexity of movement as indicated.      Date:  Eval Date:  21 Date:  21 Date:  21 Date:  21 Date:  21 Date:  21 DATE:  2.15.21 Date:  21 Date:  21 Date:  3.1.21 Date  3/5/21 Date:  3 Date:  3.11.21 Date:  3.15.21 Date:  3.18.21 Date:  3.24.21 Date:  3.30.21 Date:  21 Date:  21   Activity/Exercise Parameters Parameters Parameters                    Education HEP, POC, PT goals, anatomy/pathology                      Isometrics: ER, IR, Flexion, Abduction 10x10\" 10x10\"                     UBE  3 min each direction level 1 3 min each direction level 1 4 minutes E direction level 1 4 minutes backwards, 4 minutes forward level 2.0 4 minutes backwards, 4 minutes forward level 2.0 4 minutes backwards, 4 minutes forward level 2.0 4 minutes backwards, 4 minutes forward level 2.0 4/4 level 2 Level 4.5 4/4 Level 4.5 4/4 Level 4.5 1 min forward, 2 minutes backwards Level 4.5, 4 minutes each Level 4.5, 4 minutes each Level 4.5, 4 minutes each Level 4.5, 4 minutes each Level 5, 4 minutes each Level 5, 4 minutes each Level 5, 4 minutes each Level 5, 3 minutes each   Wand flexion/abduction  Shown how to perform for HEP                     Wall slides  10x 10x Flexion, abduction.   10x flexion  10x abduction  10x flexion  10x abduction 10x Flexion  10x Abduction 10x Flexion  10x Abduction 10x Flexion  10x Abduction 10x Flexion  10x Abduction  Manual assist abduction             AROM   10X 10x scaption,  10x snow angels    10x scaption  10x snow angels 10x scaption  10x snow angels 10x scaption  10x snow angels   10x scaption  10x snow angels 10x scaption  10x snow angels 10x scaption  10x snow angls 10x2  10x2 e TrX Rows   20x              Rows  Blue 10x10\" Black 10x10\" Black 10x10\" 23# 3x10 23# 3x10 23# 3x10 23# 3x10 23# 3x10 TrX Rows   20x   TrX Rows   20x    TrX Rows   20x TrX Rows 20x TrX 20x Black 10x10\" Black 10x10\"      Extension TB  Blue 10x10\" Black 10x10\" Black 10x10\" 13# 2x10  13# 2x10 13# 2x10 13# 2x10 13# 2x10 Black 10x10\"   Black 10x10 Black 10x10 Black 10x10 Black 10x10\" Black 10x10\"      ER TB  Blue 10x10\" Black 10x10\" Black 10x10\" Black 10x10\" Black 10x10\" Black 10x10\" Black 10x10\"  Black 10x10\"  Black 10x10\"   Black 10x10 Black 10x10 Black 10x10 Black 10x10 Black 10x10\" Blue  10x10\" Blue 10x10\"    IR TB  Blue 10x10\" Black 10x10\" Black 10x10\" Black 10x10\" Black 10x10\"  Black 10x10\" Black 10x10\"  Black 10x10\"  Black 10x10   Black 10x10 Black 10x10 Black 10x10 Black 10x10 Black 10x10\" Blue   10x10\" Blue 10x10\"    Shoulder taps  20x 20x 20x 20x 25x 25x 25x 25x 25x             Clocks   *--- Blue 5x10\" Blue 5x10\" Blue 5x10\"  Blue 5x10\" Blue 10x10\"             Shrugs   20x 5#  20x 5# 20x 7# 20 x 7# 20x 7# 20x 7# 20x 7#              Push up plus   10x  10x 10x2 10x2 10x2 10x2 10x2 10x2             Walking     4 minutes. 4 minuutes                  All Fours       L stabilizing  L stabilizing 10x10\" L stabilizing 10x10\" Plank 10x10\" Plank 10x10\"  Plank 10x10\" Plank 10x10\" Serge Notch 10x10\" Single arm 10x10\" Single arm 10x10\"  *10x10\"     Single arm raise. Ring flexion           10x    10x 12x 12x 15x 1x      ER 90 and up          10x blue             SL ER          10x              Pulleys           Abduction 4 minutes  Abduction 4 minutes Abduction 4 minutes Abduction 4 minutes     5 minutes   Scaption                  3# 2x10 3# 3z10    Bent over row                  10# 2x10 5\"  10# 2x10 5\"    Bent over shoulder extension                  4# 10x2 4# 10x2    Clocks                             MANUAL THERAPY: (25 minutes): Joint mobilization, Soft tissue mobilization was utilized and necessary because of the patient's restricted joint motion and restricted motion of soft tissue mobility. Date  4/6/2021    Technique Used Grade  Level # Time(s) Effect while being performed   PROM all planes    L all planes   STM NOT TODAY       Inferior mobs IV L GH     Shoulder quadrant supine flopping fish IV+                                 MedBridge Portal  Treatment/Session Summary:   Michelle Ziegler - Again, spent more time on manual techniques as he is limited with joint capsular mobility (abduction/ER>flexion). Progress note performed--see attached note. Response to Treatment: Please See Progress Note dated 4.6.21 for more details. Communication/Consultation:  Faxed Progress Note  to MD.   Equipment provided today: HEP Handout     Recommendations/Intent for next treatment session:   Next visit will focus on Pain Science Education RTC strengthening soft tissue mobilization.            Treatment Plan of Care Effective Dates: 4/6/2021 TO 4/19/2021 (90 days).   Frequency/Duration: 2 times a week for 90 Days             Total Treatment Billable Duration: 36' rx   PT Patient Time In/Time Out  Time In: 1350  Time Out: [address] DPT    Future Appointments   Date Time Provider Steve Galeana   4/8/2021  1:45 PM Darroll Crigler, [facility]   4/13/2021  1:45 PM Darroll Crigler, [facility]   4/20/2021  1:45 PM Darroll Crigler, DPT [facility]   4/22/2021  1:45 PM Darroll Crigler, [facility]

## 2021-04-06 NOTE — PROGRESS NOTES
Clementina Staff  : 1965      Payor: Yola Sanders / Plan: [place] [facility] / Product Type: PPO /    [address], [address], [place], [address] [place] [address] Road  [phone]   [phone]              OUTPATIENT PHYSICAL THERAPY:Progress Report and Recertification: [id number] Visit Count:  20       ICD-10: Treatment Diagnosis:   Pain in Left Shoulder (M25.512)  Stiffness of Left Shoulder not elsewhere specified (M25.612)  Primary Osteoarthritis of Left Shoulder (M19.012)  Strain of muscle(s) and tendon(s) of the rotator cuff of Left Shoulder, sequela (Q96.657J)              Precautions/Allergies:   Patient has no known allergies. Fall Risk Score: 1 (? 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 PROGRESS REPORT  Mr. Belle Berry has attended 20 visits including initial evaluation. He received cortizone shot which greatly enhanced his capsular mobility--he continues to improve, but does have tightness with abduction and ER and IR (ONLY to buttock). Manual techniques helpful, as well as scapular strengthening; however, he is not back to full ROM at this time. He is very diligent and compliant with home exercises/pulleys. Recommending extending POC 60 days as he is getting shoulder manipulation surgery and will need continued PT after this surgery to ensure proper ROM. ACTIVE ROM (standing) LEFT   Shoulder Flexion 145 deg   Shoulder Abduction 135 deg   Shoulder Internal Rotation (Apley's) Buttock.     Shoulder External Rotation (Apley's) T1       Clementina Staff will benefit from skilled PT (medically necessary) in order to address above deficits affecting participation in basic ADLs and overall functional tolerance. PROBLEM LIST (Impacting functional limitations):  · Decreased Strength  · Decreased ADL/Functional Activities  · Increased Pain  · Decreased Activity Tolerance  · Increased Shortness of Breath  · Decreased Flexibility/Joint Mobility  · Decreased Kellogg with Home Exercise Program INTERVENTIONS PLANNED:  1. Cold  2. Family Education  3. Home Exercise Program (HEP)  4. Manual Therapy  5. Neuromuscular Re-education/Strengthening  6. Range of Motion (ROM)  7. Therapeutic Activites  8. Therapeutic Exercise/Strengthening  9. Transfer Training  10. Ultrasound   TREATMENT PLAN:  Effective Dates: 4/6/2021 TO 4/19/2021 (90 days)   EXTENDED TO 6/7/2021  . Frequency/Duration: 2 times a week for 90 Days  GOALS: (Goals have been discussed and agreed upon with patient.)     Short-Term Goals~4 weeks  Goal Met   1. Serjioine Mention will report <=1/10 pain with don/doffing clothing as well as minimal/no difficulty. 1.  [x] Date: GOAL MET 4/6/2021    2. Serjioine Mention will demonstrate improvement in active shoulder scaption/flexion to >155 degrees to increase UE function and participation in ADLs. 2.  [x] Date:GOAL MET 3/5/2021    3. Selestine Mention will demonstrate demonstrate improvement in active shoulder abduction to >155 degrees to increase UE function and participation in ADLs. 3.  [] Date:   4. Serjioine Mention will show a greater than 8 point decrease on the DASH in order to show an increase in upper extremity function. 4.  [] Date:   5. Linda Mention will be independent in all HEP 5. [x] Date: GOAL MET 3/5/2021    6. Linda Mention will be able to reach backwards and put on seatbelt without difficulty. 6.  [x] Date: GOAL MET 3/5/2021          Long Term Goals~8 weeks Goal Met   1.  Serjoiine Mention will show full ROM of the UE in order to return to full functional mobility 1.  [] Date:   2. Missy Escoto will show a greater than 15 point decrease on the DASH in order to show an increase in upper extremity function 2. [] Date:   3. Missy Escoto will report doing hair/bathing without difficulty and <=2/10 pain in order to be independent with ADL's 3.  [] Date: NOT MET   4. Missy Escoto will be independent in all advanced HEP 4. [x] Date:  4/6/2021             Outcome Measure: Tool Used: Disabilities of the Arm, Shoulder and Hand (DASH) Questionnaire - Quick Version  Score:  Initial: 45/55  Most Recent: 28*/55 (Date: -- )   Interpretation of Score: The DASH is designed to measure the activities of daily living in person's with upper extremity dysfunction or pain. Each section is scored on a 1-5 scale, 5 representing the greatest disability. The scores of each section are added together for a total score of 55. Medical Necessity:   · Skilled intervention continues to be required due to deficits and impairments seen upon initial evaluation affecting patient's participation in ADLs and functional tasks. Reason for Services/Other Comments:  · Patient continues to require skilled intervention due to deficits and impairments seen upon initial evaluation affecting patient's participation in ADLs and functional tasks. Total Treatment Duration:  PT Patient Time In/Time Out  Time In: 1350  Time Out: 1430    Rehabilitation Potential For Stated Goals: good  Regarding Vazquez Payan Alek's therapy, I certify that the treatment plan above will be carried out by a therapist or under their direction. Thank you for this referral,  Thuy Bertrand, DPT     Referring Physician Signature: Anson Dickens MD             EXAMINATION:   Observation/Orthostatic Postural Assessment: Forward Head and Rounded Shoulders  Palpation:          Pain to anterior shoulder and incision sites.     ROM:    AROM/PROM       Joint: Eval Date: Eval Date  Re-Assess Date: 4.6.21   ACTIVE ROM (standing) RIGHT LEFT RIGHT LEFT   Shoulder Flexion 180  130 deg   145 deg   Shoulder Abduction 180 deg with twinge 60 deg   135 deg   Shoulder Internal Rotation (Apley's) T5 L1     Buttock   Shoulder External Rotation (Apley's) T3 Top of head     T1   Elbow ROM         PASSIVE ROM (supine)         Shoulder Flexion  160 deg        Shoulder Abduction  153 deg        Shoulder Internal Rotation  70 deg       Shoulder External Rotation  30 deg                                     Strength:    Joint: Eval Date: 4/6/2021  Re-Assess Date:     RIGHT LEFT RIGHT LEFT   Shoulder Flexion 5/5 3+/5    4+/5   Shoulder Abduction  (C5) 5/5 4-/5    4+/5   Shoulder Internal Rotation 5/5 4/5    4+/5   Shoulder External Rotation 5/5 3+/5    4+5   Elbow Flexion  (C6) 5/5 4-/5    4+/5   Elbow Extension (C7) 5/5 4+/5       Wrist Flexion (C7) 5/5 5/5       Wrist Extension (C6)         Resisted Thumb Extension/Finger Abduction (C8/T1)         Resisted Cervical Rotation (C1):         Resisted Shoulder Shrug (C2, 3, 4):           Strength                                        Functional Mobility:  Assessed @ Initial Visit  -- Limited [place] movement. Mila abduction. See associated treatment note for treatment provided today.     Future Appointments   Date Time Provider Steve Galeana   4/8/2021  1:45 PM MILTON Rodriguez   4/13/2021  1:45 PM MILTON Rodriguez   4/20/2021  1:45 PM MILTON Rodriguez   4/22/2021  1:45 PM Bonner Covey, DPT [facility] Marylene Hole, DPT

## 2021-04-08 ENCOUNTER — HOSPITAL ENCOUNTER (OUTPATIENT)
Dept: PHYSICAL THERAPY | Age: 56
Discharge: HOME | End: 2021-04-08
Payer: COMMERCIAL

## 2021-04-08 PROCEDURE — 97110 THERAPEUTIC EXERCISES: CPT

## 2021-04-08 PROCEDURE — 97140 MANUAL THERAPY 1/> REGIONS: CPT

## 2021-04-08 NOTE — PROGRESS NOTES
Compa Jacobo  : 1965  Payor: Devan Aron / Plan: [place] [facility] / Product Type: PPO /  [address] [facility], [address] [place] [address], [id number] [place] [address]  [phone]   [phone]                                                          Tali Santiago MD      OUTPATIENT PHYSICAL THERAPY: Daily Treatment Note 2021 Visit Count:  21    Tx Diagnosis:    Pain in Left Shoulder (M25.512)  Stiffness of Left Shoulder not elsewhere specified (M25.612)  Primary Osteoarthritis of Left Shoulder (M19.012)  Strain of muscle(s) and tendon(s) of the rotator cuff of Left Shoulder, sequela (S46.012S)           Pre-treatment Symptoms/Complaints:    \"I'm going to get the surgery in 2 weeks to get my ROM back. \"        Pain: Initial:2/10 Post Session: 1/10   Medications Last Reviewed:  2021     Updated Objective Findings: See Initial Eval for more details. TREATMENT:   THERAPEUTIC EXERCISE: (30 minutes):  Exercises per grid below to improve mobility, strength and balance. Required minimal visual, verbal and manual cues to promote proper body alignment and promote proper body posture. Progressed resistance and complexity of movement as indicated.      Date:  Eval Date:  21 Date:  21 Date:  21 Date:  21 Date:  21 Date:  21 DATE:  2.15.21 Date:  21 Date:  21 Date:  3.1.21 Date  3/5/21 Date:  3.8.21 Date:  3.11.21 Date:  3.15.21 Date:  3.18.21 Date:  3.24.21 Date:  3.30.21 Date:  21 Date:  21 Date:  21   Activity/Exercise Parameters Parameters Parameters                     Education HEP, POC, PT goals, anatomy/pathology                       Isometrics: ER, IR, Flexion, Abduction 10x10\" 10x10\"                      UBE  3 min each direction level 1 3 min each direction level 1 4 minutes E direction level 1 4 minutes backwards, 4 minutes forward level 2.0 4 minutes backwards, 4 minutes forward level 2.0 4 minutes backwards, 4 minutes forward level 2.0 4 minutes backwards, 4 minutes forward level 2.0 4/4 level 2 Level 4.5 4/4 Level 4.5 4/4 Level 4.5 1 min forward, 2 minutes backwards Level 4.5, 4 minutes each Level 4.5, 4 minutes each Level 4.5, 4 minutes each Level 4.5, 4 minutes each Level 5, 4 minutes each Level 5, 4 minutes each Level 5, 4 minutes each Level 5, 3 minutes each Level 5, 3 minutes each   Wand flexion/abduction  Shown how to perform for HEP                      Wall slides  10x 10x Flexion, abduction.   10x flexion  10x abduction  10x flexion  10x abduction 10x Flexion  10x Abduction 10x Flexion  10x Abduction 10x Flexion  10x Abduction 10x Flexion  10x Abduction  Manual assist abduction              AROM   10X 10x scaption,  10x snow angels    10x scaption  10x snow angels 10x scaption  10x snow angels 10x scaption  10x snow angels   10x scaption  10x snow angels 10x scaption  10x snow angels 10x scaption  10x snow angls 10x2  10x2 e TrX Rows   20x               Rows  Blue 10x10\" Black 10x10\" Black 10x10\" 23# 3x10 23# 3x10 23# 3x10 23# 3x10 23# 3x10 TrX Rows   20x   TrX Rows   20x    TrX Rows   20x TrX Rows 20x TrX 20x Black 10x10\" Black 10x10\"    Blue 10x10\"   Extension TB  Blue 10x10\" Black 10x10\" Black 10x10\" 13# 2x10  13# 2x10 13# 2x10 13# 2x10 13# 2x10 Black 10x10\"   Black 10x10 Black 10x10 Black 10x10 Black 10x10\" Black 10x10\"    Blue 10x10\"   ER TB  Blue 10x10\" Black 10x10\" Black 10x10\" Black 10x10\" Black 10x10\" Black 10x10\" Black 10x10\"  Black 10x10\"  Black 10x10\"   Black 10x10 Black 10x10 Black 10x10 Black 10x10 Black 10x10\" Blue  10x10\" Blue 10x10\"  Blue 10x10\"   IR TB  Blue 10x10\" Black 10x10\" Black 10x10\" Black 10x10\" Black 10x10\"  Black 10x10\" Black 10x10\"  Black 10x10\"  Black 10x10   Black 10x10 Black 10x10 Black 10x10 Black 10x10 Black 10x10\" Blue   10x10\" Blue 10x10\"  Blue 10x10\"   Shoulder taps  20x 20x 20x 20x 25x 25x 25x 25x 25x              Clocks   *---   Blue 5x10\" Blue 5x10\" Blue 5x10\"  Blue 5x10\" Blue 10x10\"              Shrugs   20x 5#  20x 5# 20x 7# 20 x 7# 20x 7# 20x 7# 20x 7#               Push up plus   10x  10x 10x2 10x2 10x2 10x2 10x2 10x2              Walking     4 minutes. 4 minuutes                   All Fours       L stabilizing  L stabilizing 10x10\" L stabilizing 10x10\" Plank 10x10\" Plank 10x10\"  Plank 10x10\" Plank 10x10\" Patt Sportsman 10x10\" Single arm 10x10\" Single arm 10x10\"  *10x10\"     Single arm raise. Ring flexion           10x    10x 12x 12x 15x 1x       ER 90 and up          10x blue              SL ER          10x               Pulleys           Abduction 4 minutes  Abduction 4 minutes Abduction 4 minutes Abduction 4 minutes     5 minutes    Scaption                  3# 2x10 3# 3z10  3# 10x2   Bent over row                  10# 2x10 5\"  10# 2x10 5\"     Bent over shoulder extension                  4# 10x2 4# 10x2  3# 10x2   Clocks                              MANUAL THERAPY: (25 minutes): Joint mobilization, Soft tissue mobilization was utilized and necessary because of the patient's restricted joint motion and restricted motion of soft tissue mobility. Date  4/8/2021    Technique Used Grade  Level # Time(s) Effect while being performed   PROM all planes    L all planes   STM NOT TODAY       Inferior mobs IV L GH     Shoulder quadrant supine flopping fish IV+                                 MedBridge Portal  Treatment/Session Summary:   Kalyani Stair - Continued strengthening--abduction AAROM is improving but still lacking full AAROM and AROM with this plane. Response to Treatment: Please See Progress Note dated 4.6.21 for more details. Communication/Consultation:  Faxed Progress Note  to MD.   Equipment provided today: HEP Handout     Recommendations/Intent for next treatment session:   Next visit will focus on Pain Science Education RTC strengthening soft tissue mobilization.            Treatment Plan of Care Effective Dates: 4/8/2021 TO 4/19/2021 (90 days).     EXTENDED TO 6/7/2021  Frequency/Duration: 2 times a week for 90 Days             Total Treatment Billable Duration: 54' Rx   PT Patient Time In/Time Out  Time In: 1350  Time Out: Sabine Morgan DPT    Future Appointments   Date Time Provider Steve Galeana   4/13/2021  1:45 PM MILTON Rodriguez   4/20/2021  1:45 PM MILTON Rodriguez   4/22/2021  1:45 PM MILTON Rodriguez

## 2021-04-13 ENCOUNTER — HOSPITAL ENCOUNTER (OUTPATIENT)
Dept: PHYSICAL THERAPY | Age: 56
Discharge: HOME | End: 2021-04-13
Payer: COMMERCIAL

## 2021-04-13 PROCEDURE — 97110 THERAPEUTIC EXERCISES: CPT

## 2021-04-13 PROCEDURE — 97140 MANUAL THERAPY 1/> REGIONS: CPT

## 2021-04-13 NOTE — PROGRESS NOTES
Rui Hidalgo  : 1965  Payor: Scott Durham / Plan: [place] [facility] / Product Type: O /  [address] [facility] at [address] [place] [address]. [facility], [address] [place] [address]  [phone]   [phone]                                                          Cornelio House MD      OUTPATIENT PHYSICAL THERAPY: Daily Treatment Note 2021 Visit Count:  22    Tx Diagnosis:    Pain in Left Shoulder (M25.512)  Stiffness of Left Shoulder not elsewhere specified (M25.612)  Primary Osteoarthritis of Left Shoulder (M19.012)  Strain of muscle(s) and tendon(s) of the rotator cuff of Left Shoulder, sequela (S46.012S)           Pre-treatment Symptoms/Complaints:    \"I feel like I'm at a plateau--I don't get pain but it's like around the joint--tight. Little bit up on top, but not much. \"        Pain: Initial:2/10 Post Session: 1/10   Medications Last Reviewed:  2021     Updated Objective Findings: See Initial Eval for more details. TREATMENT:   THERAPEUTIC EXERCISE: (25 minutes):  Exercises per grid below to improve mobility, strength and balance. Required minimal visual, verbal and manual cues to promote proper body alignment and promote proper body posture. Progressed resistance and complexity of movement as indicated.      Date:  Eval Date:  21 Date:  21 Date:  21 Date:  21 Date:  21 Date:  21 DATE:  2.15.21 Date:  21 Date:  21 Date:  3.1.21 Date  3/5/21 Date:  3.8.21 Date:  3.11.21 Date:  3.15.21 Date:  3.18.21 Date:  3.24.21 Date:  3.30.21 Date:  21 Date:  21 Date:  21 Date:  21   Activity/Exercise Parameters Parameters Parameters                      Education HEP, POC, PT goals, anatomy/pathology                        Isometrics: ER, IR, Flexion, Abduction 10x10\" 10x10\"                       UBE  3 min each direction level 1 3 min each direction level 1 4 minutes E direction level 1 4 minutes backwards, 4 minutes forward level 2.0 4 minutes backwards, 4 minutes forward level 2.0 4 minutes backwards, 4 minutes forward level 2.0 4 minutes backwards, 4 minutes forward level 2.0 4/4 level 2 Level 4.5 4/4 Level 4.5 4/4 Level 4.5 1 min forward, 2 minutes backwards Level 4.5, 4 minutes each Level 4.5, 4 minutes each Level 4.5, 4 minutes each Level 4.5, 4 minutes each Level 5, 4 minutes each Level 5, 4 minutes each Level 5, 4 minutes each Level 5, 3 minutes each Level 5, 3 minutes each Level 5, 3 minutes each   Wand flexion/abduction  Shown how to perform for HEP                       Wall slides  10x 10x Flexion, abduction.   10x flexion  10x abduction  10x flexion  10x abduction 10x Flexion  10x Abduction 10x Flexion  10x Abduction 10x Flexion  10x Abduction 10x Flexion  10x Abduction  Manual assist abduction               AROM   10X 10x scaption,  10x snow angels    10x scaption  10x snow angels 10x scaption  10x snow angels 10x scaption  10x snow angels   10x scaption  10x snow angels 10x scaption  10x snow angels 10x scaption  10x snow angls 10x2  10x2 e TrX Rows   20x                Rows  Blue 10x10\" Black 10x10\" Black 10x10\" 23# 3x10 23# 3x10 23# 3x10 23# 3x10 23# 3x10 TrX Rows   20x   TrX Rows   20x    TrX Rows   20x TrX Rows 20x TrX 20x Black 10x10\" Black 10x10\"    Blue 10x10\" Blue 10x10\"   Extension TB  Blue 10x10\" Black 10x10\" Black 10x10\" 13# 2x10  13# 2x10 13# 2x10 13# 2x10 13# 2x10 Black 10x10\"   Black 10x10 Black 10x10 Black 10x10 Black 10x10\" Black 10x10\"    Blue 10x10\" Blue 10x10\"   ER TB  Blue 10x10\" Black 10x10\" Black 10x10\" Black 10x10\" Black 10x10\" Black 10x10\" Black 10x10\"  Black 10x10\"  Black 10x10\"   Black 10x10 Black 10x10 Black 10x10 Black 10x10 Black 10x10\" Blue  10x10\" Blue 10x10\"  Blue 10x10\" Blue 10x10\"   IR TB  Blue 10x10\" Black 10x10\" Black 10x10\" Black 10x10\" Black 10x10\"  Black 10x10\" Black 10x10\"  Black 10x10\"  Black 10x10   Black 10x10 Black 10x10 Black 10x10 Black 10x10 Black 10x10\" Blue   10x10\" Blue 10x10\"  Blue 10x10\" Blue 10x10\"   Shoulder taps  20x 20x 20x 20x 25x 25x 25x 25x 25x               Clocks   *---   Blue 5x10\" Blue 5x10\" Blue 5x10\"  Blue 5x10\" Blue 10x10\"               Shrugs   20x 5#  20x 5# 20x 7# 20 x 7# 20x 7# 20x 7# 20x 7#                Push up plus   10x  10x 10x2 10x2 10x2 10x2 10x2 10x2               Walking     4 minutes. 4 minuutes                    All Fours       L stabilizing  L stabilizing 10x10\" L stabilizing 10x10\" Plank 10x10\" Plank 10x10\"  Plank 10x10\" Plank 10x10\" Mike Shore 10x10\" Single arm 10x10\" Single arm 10x10\"  *10x10\"     Single arm raise. Ring flexion           10x    10x 12x 12x 15x 1x        ER 90 and up          10x blue               SL ER          10x                Pulleys           Abduction 4 minutes  Abduction 4 minutes Abduction 4 minutes Abduction 4 minutes     5 minutes  5'   Scaption                  3# 2x10 3# 3z10  3# 10x2 3# 20x   Bent over row                  10# 2x10 5\"  10# 2x10 5\"   10# 2x10 5\"   Bent over shoulder extension                  4# 10x2 4# 10x2  3# 10x2 4# 20x   Clocks                               MANUAL THERAPY: (15  minutes): Joint mobilization, Soft tissue mobilization was utilized and necessary because of the patient's restricted joint motion and restricted motion of soft tissue mobility. Date  4/13/2021    Technique Used Grade  Level # Time(s) Effect while being performed   PROM all planes    L all planes   STM NOT TODAY       Inferior mobs IV L GH     Shoulder quadrant supine flopping fish IV+                                 MedBridge Portal  Treatment/Session Summary:   Annjuan Call -continued strengthening--PROM helps, but he is very tight at end range ER. Tightness with Rows exhibited---difficulty with extension due to anterior capsule tightness. Response to Treatment: Please See Progress Note dated 4.6.21 for more details.    Communication/Consultation:  Faxed Progress Note  to MD.   Equipment provided today: HEP Handout     Recommendations/Intent for next treatment session:   Next visit will focus on Pain Science Education RTC strengthening soft tissue mobilization. Treatment Plan of Care Effective Dates: 4/13/2021 TO 4/19/2021 (90 days).     EXTENDED TO 6/7/2021  Frequency/Duration: 2 times a week for 90 Days             Total Treatment Billable Duration: 36' Rx   PT Patient Time In/Time Out  Time In: 1345  Time Out: [address] Irena Torres DPT    Future Appointments   Date Time Provider Steve Galeana   4/20/2021  1:45 PM MILTON Umanzor   4/22/2021  1:45 PM MILTON Umanzor

## 2021-04-20 ENCOUNTER — HOSPITAL ENCOUNTER (OUTPATIENT)
Dept: PHYSICAL THERAPY | Age: 56
End: 2021-04-20
Payer: COMMERCIAL

## 2021-04-21 ENCOUNTER — HOSPITAL ENCOUNTER (OUTPATIENT)
Dept: PHYSICAL THERAPY | Age: 56
Discharge: HOME | End: 2021-04-21
Payer: COMMERCIAL

## 2021-04-21 PROCEDURE — 97110 THERAPEUTIC EXERCISES: CPT

## 2021-04-21 PROCEDURE — 97140 MANUAL THERAPY 1/> REGIONS: CPT

## 2021-04-21 NOTE — PROGRESS NOTES
Clementina Staff  : 1965  Payor: Yola Sanders / Plan: [place] [facility] / Product Type: PPO /  [address] at [address] [facility]. [facility], [address] [place] [address], [address] [place] [address]  [phone]   [phone]                                                          Tabitha Waldron MD      OUTPATIENT PHYSICAL THERAPY: Daily Treatment Note 2021 Visit Count:  23    Tx Diagnosis:    Pain in Left Shoulder (M25.512)  Stiffness of Left Shoulder not elsewhere specified (M25.612)  Primary Osteoarthritis of Left Shoulder (M19.012)  Strain of muscle(s) and tendon(s) of the rotator cuff of Left Shoulder, sequela (S46.012S)           Pre-treatment Symptoms/Complaints:    \"I still haven't scheduled my surgery-- insurance is holding it up. \"        Pain: Initial:2/10 Post Session: 1/10   Medications Last Reviewed:  2021     Updated Objective Findings: See Initial Eval for more details. TREATMENT:   THERAPEUTIC EXERCISE: (30 minutes):  Exercises per grid below to improve mobility, strength and balance. Required minimal visual, verbal and manual cues to promote proper body alignment and promote proper body posture. Progressed resistance and complexity of movement as indicated.      Date:  Eval Date:  21 Date:  21 Date:  21 Date:  21 Date:  21 Date:  21 DATE:  2.15.21 Date:  21 Date:  21 Date:  3.1.21 Date  3/5/21 Date:  3.8.21 Date:  3.11.21 Date:  3.15.21 Date:  3.18.21 Date:  3.24.21 Date:  3.30.21 Date:  21 Date:  21 Date:  21 Date:  21 Date:  21   Activity/Exercise Parameters Parameters Parameters                       Education HEP, POC, PT goals, anatomy/pathology                         Isometrics: ER, IR, Flexion, Abduction 10x10\" 10x10\"                        UBE  3 min each direction level 1 3 min each direction level 1 4 minutes E direction level 1 4 minutes backwards, 4 minutes forward level 2.0 4 minutes backwards, 4 minutes forward level 2.0 4 minutes backwards, 4 minutes forward level 2.0 4 minutes backwards, 4 minutes forward level 2.0 4/4 level 2 Level 4.5 4/4 Level 4.5 4/4 Level 4.5 1 min forward, 2 minutes backwards Level 4.5, 4 minutes each Level 4.5, 4 minutes each Level 4.5, 4 minutes each Level 4.5, 4 minutes each Level 5, 4 minutes each Level 5, 4 minutes each Level 5, 4 minutes each Level 5, 3 minutes each Level 5, 3 minutes each Level 5, 3 minutes each Medium resistance 4 minutes each direction. Wand flexion/abduction  Shown how to perform for HEP                        Wall slides  10x 10x Flexion, abduction.   10x flexion  10x abduction  10x flexion  10x abduction 10x Flexion  10x Abduction 10x Flexion  10x Abduction 10x Flexion  10x Abduction 10x Flexion  10x Abduction  Manual assist abduction                AROM   10X 10x scaption,  10x snow angels    10x scaption  10x snow angels 10x scaption  10x snow angels 10x scaption  10x snow angels   10x scaption  10x snow angels 10x scaption  10x snow angels 10x scaption  10x snow angls 10x2  10x2 e TrX Rows   20x                 Rows  Blue 10x10\" Black 10x10\" Black 10x10\" 23# 3x10 23# 3x10 23# 3x10 23# 3x10 23# 3x10 TrX Rows   20x   TrX Rows   20x    TrX Rows   20x TrX Rows 20x TrX 20x Black 10x10\" Black 10x10\"    Blue 10x10\" Blue 10x10\" Blue 10x10\"   Extension TB  Blue 10x10\" Black 10x10\" Black 10x10\" 13# 2x10  13# 2x10 13# 2x10 13# 2x10 13# 2x10 Black 10x10\"   Black 10x10 Black 10x10 Black 10x10 Black 10x10\" Black 10x10\"    Blue 10x10\" Blue 10x10\" Blue 10x10\"   ER TB  Blue 10x10\" Black 10x10\" Black 10x10\" Black 10x10\" Black 10x10\" Black 10x10\" Black 10x10\"  Black 10x10\"  Black 10x10\"   Black 10x10 Black 10x10 Black 10x10 Black 10x10 Black 10x10\" Blue  10x10\" Blue 10x10\"  Blue 10x10\" Blue 10x10\" Blue 10x10\"   IR TB  Blue 10x10\" Black 10x10\" Black 10x10\" Black 10x10\" Black 10x10\"  Black 10x10\" Black 10x10\"  Black 10x10\"  Black 10x10   Black 10x10 Black 10x10 Black 10x10 Black 10x10 Black 10x10\" Blue   10x10\" Blue 10x10\"  Blue 10x10\" Blue 10x10\" Blue 10x10\"   Shoulder taps  20x 20x 20x 20x 25x 25x 25x 25x 25x                Clocks   *---   Blue 5x10\" Blue 5x10\" Blue 5x10\"  Blue 5x10\" Blue 10x10\"                Shrugs   20x 5#  20x 5# 20x 7# 20 x 7# 20x 7# 20x 7# 20x 7#                 Push up plus   10x  10x 10x2 10x2 10x2 10x2 10x2 10x2                Walking     4 minutes. 4 minuutes                     All Fours       L stabilizing  L stabilizing 10x10\" L stabilizing 10x10\" Plank 10x10\" Plank 10x10\"  Plank 10x10\" Plank 10x10\" Veatrice Him 10x10\" Single arm 10x10\" Single arm 10x10\"  *10x10\"     Single arm raise. Ring flexion           10x    10x 12x 12x 15x 1x         ER 90 and up          10x blue                SL ER          10x                 Pulleys           Abduction 4 minutes  Abduction 4 minutes Abduction 4 minutes Abduction 4 minutes     5 minutes  5' 5'   Scaption                  3# 2x10 3# 3z10  3# 10x2 3# 20x 3# 20x   Bent over row                  10# 2x10 5\"  10# 2x10 5\"   10# 2x10 5\" 10# 2x10 5\"   Bent over shoulder extension                  4# 10x2 4# 10x2  3# 10x2 4# 20x 4# 20x   Clocks                                MANUAL THERAPY: (25  minutes): Joint mobilization, Soft tissue mobilization was utilized and necessary because of the patient's restricted joint motion and restricted motion of soft tissue mobility. Date  4/21/2021    Technique Used Grade  Level # Time(s) Effect while being performed   PROM all planes    L all planes   STM NOT TODAY       Inferior mobs IV L GH     Shoulder quadrant  IV+   Working on end range movement/stretch                              MedBridge Portal  Treatment/Session Summary:   Vandana Castorena is actually stiffer today than last session---hopefully he gets mainupation to loosen up joint soon.  Mainly imitation is with ER--with shoulder quadrant he does loosen up some, but feel is stiffer throughout remaining 20 degrees versus just end range. Response to Treatment: Please See Progress Note dated 4.6.21 for more details. Communication/Consultation:  Faxed Progress Note  to MD.   Equipment provided today: HEP Handout     Recommendations/Intent for next treatment session:   Next visit will focus on Pain Science Education RTC strengthening soft tissue mobilization. Treatment Plan of Care Effective Dates: 4/21/2021 TO 4/19/2021 (90 days).     EXTENDED TO 6/7/2021  Frequency/Duration: 2 times a week for 90 Days             Total Treatment Billable Duration: 54' Rx   PT Patient Time In/Time Out  Time In: 1345  Time Out: 181 Edwige Encarnacion DPT    Future Appointments   Date Time Provider Steve Galeana   4/22/2021  1:45 PM Chilango Ash DPT [facility]

## 2021-04-22 ENCOUNTER — HOSPITAL ENCOUNTER (OUTPATIENT)
Dept: PHYSICAL THERAPY | Age: 56
Discharge: HOME | End: 2021-04-22
Payer: COMMERCIAL

## 2021-04-22 PROCEDURE — 97110 THERAPEUTIC EXERCISES: CPT

## 2021-04-22 PROCEDURE — 97140 MANUAL THERAPY 1/> REGIONS: CPT

## 2021-04-22 NOTE — PROGRESS NOTES
Shelly Cuenca  : 1965  Payor: Mateo Rodriguez / Plan: [place] [facility] / Product Type: O /  [address] [facility] at [address] [place] [address]. [facility], [address] [place] [address]  [phone]   [phone]                                                          Radha Hutchins MD      OUTPATIENT PHYSICAL THERAPY: Daily Treatment Note 2021 Visit Count:  24    Tx Diagnosis:    Pain in Left Shoulder (M25.512)  Stiffness of Left Shoulder not elsewhere specified (M25.612)  Primary Osteoarthritis of Left Shoulder (M19.012)  Strain of muscle(s) and tendon(s) of the rotator cuff of Left Shoulder, sequela (S46.012S)           Pre-treatment Symptoms/Complaints:    \"I am going on vacation and taking my fishing rajinder to surf cast with me. \"        Pain: Initial:1/10 Post Session: 1/10   Medications Last Reviewed:  2021     Updated Objective Findings: See Initial Eval for more details. TREATMENT:   THERAPEUTIC EXERCISE: (30 minutes):  Exercises per grid below to improve mobility, strength and balance. Required minimal visual, verbal and manual cues to promote proper body alignment and promote proper body posture. Progressed resistance and complexity of movement as indicated.      Date:  Eval Date:  21 Date:  21 Date:  21 Date:  21 Date:  21 Date:  21 DATE:  2.15.21 Date:  21 Date:  21 Date:  3.1.21 Date  3/5/21 Date:  3 Date:  3.11.21 Date:  3.15.21 Date:  3.18.21 Date:  3.24.21 Date:  3.30.21 Date:  21 Date:  21 Date:  21 Date:  21 Date:  21 Date:  21   Activity/Exercise Parameters Parameters Parameters                        Education HEP, POC, PT goals, anatomy/pathology                          Isometrics: ER, IR, Flexion, Abduction 10x10\" 10x10\"                         UBE  3 min each direction level 1 3 min each direction level 1 4 minutes E direction level 1 4 minutes backwards, 4 minutes forward level 2.0 4 minutes backwards, 4 minutes forward level 2.0 4 minutes backwards, 4 minutes forward level 2.0 4 minutes backwards, 4 minutes forward level 2.0 4/4 level 2 Level 4.5 4/4 Level 4.5 4/4 Level 4.5 1 min forward, 2 minutes backwards Level 4.5, 4 minutes each Level 4.5, 4 minutes each Level 4.5, 4 minutes each Level 4.5, 4 minutes each Level 5, 4 minutes each Level 5, 4 minutes each Level 5, 4 minutes each Level 5, 3 minutes each Level 5, 3 minutes each Level 5, 3 minutes each Medium resistance 4 minutes each direction. 4' each way with resistance on UBE   Wand flexion/abduction  Shown how to perform for HEP                         Wall slides  10x 10x Flexion, abduction.   10x flexion  10x abduction  10x flexion  10x abduction 10x Flexion  10x Abduction 10x Flexion  10x Abduction 10x Flexion  10x Abduction 10x Flexion  10x Abduction  Manual assist abduction                 AROM   10X 10x scaption,  10x snow angels    10x scaption  10x snow angels 10x scaption  10x snow angels 10x scaption  10x snow angels   10x scaption  10x snow angels 10x scaption  10x snow angels 10x scaption  10x snow angls 10x2  10x2 e TrX Rows   20x                  Rows  Blue 10x10\" Black 10x10\" Black 10x10\" 23# 3x10 23# 3x10 23# 3x10 23# 3x10 23# 3x10 TrX Rows   20x   TrX Rows   20x    TrX Rows   20x TrX Rows 20x TrX 20x Black 10x10\" Black 10x10\"    Blue 10x10\" Blue 10x10\" Blue 10x10\" Blue 10x10\"   Extension TB  Blue 10x10\" Black 10x10\" Black 10x10\" 13# 2x10  13# 2x10 13# 2x10 13# 2x10 13# 2x10 Black 10x10\"   Black 10x10 Black 10x10 Black 10x10 Black 10x10\" Black 10x10\"    Blue 10x10\" Blue 10x10\" Blue 10x10\" Blue 10x10\"   ER TB  Blue 10x10\" Black 10x10\" Black 10x10\" Black 10x10\" Black 10x10\" Black 10x10\" Black 10x10\"  Black 10x10\"  Black 10x10\"   Black 10x10 Black 10x10 Black 10x10 Black 10x10 Black 10x10\" Blue  10x10\" Blue 10x10\"  Blue 10x10\" Blue 10x10\" Blue 10x10\" Blue 10x10\"   IR TB  Blue 10x10\" Black 10x10\" Black 10x10\" Black 10x10\" Black 10x10\"  Black 10x10\" Black 10x10\"  Black 10x10\"  Black 10x10   Black 10x10 Black 10x10 Black 10x10 Black 10x10 Black 10x10\" Blue   10x10\" Blue 10x10\"  Blue 10x10\" Blue 10x10\" Blue 10x10\" Blue 10x10\"   Shoulder taps  20x 20x 20x 20x 25x 25x 25x 25x 25x                 Clocks   *---   Blue 5x10\" Blue 5x10\" Blue 5x10\"  Blue 5x10\" Blue 10x10\"                 Shrugs   20x 5#  20x 5# 20x 7# 20 x 7# 20x 7# 20x 7# 20x 7#                  Push up plus   10x  10x 10x2 10x2 10x2 10x2 10x2 10x2                 Walking     4 minutes. 4 minuutes                      All Fours       L stabilizing  L stabilizing 10x10\" L stabilizing 10x10\" Plank 10x10\" Plank 10x10\"  Plank 10x10\" Plank 10x10\" Anjana Parkers 10x10\" Single arm 10x10\" Single arm 10x10\"  *10x10\"     Single arm raise. Ring flexion           10x    10x 12x 12x 15x 1x          ER 90 and up          10x blue                 SL ER          10x                  Pulleys           Abduction 4 minutes  Abduction 4 minutes Abduction 4 minutes Abduction 4 minutes     5 minutes  5' 5' 5'   Scaption                  3# 2x10 3# 3z10  3# 10x2 3# 20x 3# 20x 3# 30x   Bent over row                  10# 2x10 5\"  10# 2x10 5\"   10# 2x10 5\" 10# 2x10 5\" 10# 2x0   Bent over shoulder extension                  4# 10x2 4# 10x2  3# 10x2 4# 20x 4# 20x 4# 20x   Clocks                                 MANUAL THERAPY: (25  minutes): Joint mobilization, Soft tissue mobilization was utilized and necessary because of the patient's restricted joint motion and restricted motion of soft tissue mobility.         Date  4/22/2021    Technique Used Grade  Level # Time(s) Effect while being performed   PROM all planes    L all planes   STM NOT TODAY       Inferior mobs IV L GH     Shoulder quadrant  IV+   Working on end range movement/stretch                              MedBridge Portal  Treatment/Session Summary:   Squire Anchors is still very tight with ER  - even so with aggressive end range mobs. He is going on vacatiom this week and it may be helpful for him to take a week off from therapy and call after vacation is over. (1 week). I do recommend manipulation at this time to get remaining degrees ER. Response to Treatment: Please See Progress Note dated 4.6.21 for more details. Communication/Consultation:  Faxed Progress Note  to MD.   Equipment provided today: HEP Handout     Recommendations/Intent for next treatment session:   Next visit will focus on Pain Science Education RTC strengthening soft tissue mobilization. Treatment Plan of Care Effective Dates: 4/22/2021 TO 4/19/2021 (90 days). EXTENDED TO 6/7/2021  Frequency/Duration: 2 times a week for 90 Days             Total Treatment Billable Duration: 54' Rx   PT Patient Time In/Time Out  Time In: 1355  Time Out: 0[address] [facility], DPT    No future appointments.

## 2021-05-12 NOTE — PROGRESS NOTES
Called Niya Russo to check in --- he has tenderness on backside and top of shoulder. He has manipulation scheduled tomorrow and will see Will Fiona Khalil to begin PT Friday @ 12:30.

## 2021-05-14 ENCOUNTER — HOSPITAL ENCOUNTER (OUTPATIENT)
Dept: PHYSICAL THERAPY | Age: 56
Discharge: HOME | End: 2021-05-14
Payer: COMMERCIAL

## 2021-05-14 PROCEDURE — 97164 PT RE-EVAL EST PLAN CARE: CPT

## 2021-05-14 PROCEDURE — 97110 THERAPEUTIC EXERCISES: CPT

## 2021-05-14 NOTE — PROGRESS NOTES
Compa Jacobo  : 1965  Primary: [facility]  Secondary:  [facility]  [address], [address]  [phone]   [phone]         OUTPATIENT PHYSICAL THERAPY:Daily Note 2021      TREATMENT:   PT Patient Time In/Time Out  Time In: 1230  Time Out: 1320      Total Time: 50min  Visit Count:  25     ICD-10: Treatment Diagnosis: M25.512, B50.836V, Z47.89  Medication Last Reviewed: 21      TREATMENT PLAN  Effective Dates: 2021 TO 2021 (30 days). Frequency/Duration: 2-4 times a week for 30 Day(s)         Subjective: See Recertification Note dated 2021 for details   Pain:     Objective:  See Recertification Note dated 2021 for details      Therapeutic Exercise: (40min) Done in order to improve strength, ROM and understanding of current condition. Date:   Date:   Date:   Date:     Activity/Exercise Parameters      Education Discussed examination findings, HEP, plan of care                                             Manual Therapy: (0min) Done in order to improve joint and soft tissue mobility,reduce muscle guarding, and decrease muscle tone   Date:   Date:   Date:   Date:     Type Parameters      Joint Mobilization       Soft Tissue Mobilization           Modalities: (-) Done in order to reduce swelling and pain    Assessment:  See Recertification Note dated 2021 for details    Plan:  See Recertification Note dated 2021 for details    No future appointments.     Unbilled Time: 10min eval  Units: 1 eval low/ 3TE      Darius Pace, PT, DPT, OCS    Visit Approval Visit # Therapist initials Date A NS / Cx < 24 hr >24 hr Cx Comments    1 WJ  [x]  [] [] Initial evaluation       [] [] []        [] [] []        [] [] []        [] [] []        [] [] []        [] [] []        [] [] []        [] [] []        [] [] []        [] [] []        [] [] []        [] [] []        [] [] []        [] [] []        [] [] []        [] [] []        [] [] []

## 2021-05-14 NOTE — THERAPY RECERTIFICATION
Isaias Durham : 1965 Primary: [facility] [employer] [facility] Secondary:  Therapy Center at [address] [place] [address], [address] [place] [address] [address], [address] [phone]   [phone] OUTPATIENT PHYSICAL THERAPY:Recertification 3/35/3862 ICD-10: Treatment Diagnosis: M25.512, F01.773G, Z47.89 Precautions/Allergies:  
Patient has no known allergies. TREATMENT PLAN: 
Effective Dates: 2021 TO 2021 (30 days). Frequency/Duration: 2-4 times a week for 30 Day(s) MEDICAL/REFERRING DIAGNOSIS: 
Encounter for other orthopedic aftercare [Z47.89] DATE OF ONSET: 2021 REFERRING PHYSICIAN: Aleyda Mcnamara MD MD Orders: Alex Malloy and treat Return MD Appointment:   
 
INITIAL ASSESSMENT:  Mr. Lisa Vick presents presented to physical therapy with MD diagnosis of a s/p L shoulder ANTHONY. Pt demonstrated signs and symptoms consistent with this diagnosis. On objective examination, the patient demonstrated deficits in ROM, strength, joint mobility, soft tissue mobility, functional ability. The patient also has decreased pain. These patient is limited in: lifting, reaching, work, perform ADLs, exercise, do hobbies, perform necessary tasks. The patient has a good prognosis for recovery based on the examination findings and may be limited by: insurance. Patient continues to require skilled physical therapy services in order to return to prior level of function. PROBLEM LIST (Impacting functional limitations): 1. Decreased Strength 2. Decreased ADL/Functional Activities 3. Increased Pain 4. Decreased Activity Tolerance 5. Decreased Flexibility/Joint Mobility 6. Decreased Knowledge of Precautions 7. Decreased [place] with Home Exercise Program INTERVENTIONS PLANNED: (Treatment may consist of any combination of the following) 1. Cold 2. Heat 3. Home Exercise Program (HEP) 4. Manual Therapy 5. Neuromuscular Re-education/Strengthening 6.  Range of Motion (ROM) 7. Therapeutic Activites 8. Therapeutic Exercise/Strengthening GOALS: (Goals have been discussed and agreed upon with patient.) Discharge Goals: Time Frame: 5 weeks 1. Pt will be compliant with progressive HEP 2. Pt will have L shoulder flexion > 150deg 3. Pt will have L shoulder ER > 35deg OUTCOME MEASURE:  
OUTCOME: PEDRO Shoulder Score Initial Score: 51/100 Most Recent: X/100 (Date: XX/XX) Interpretation of Score: 20 questions each scored on a 3 point scale with 0 representing \"extreme difficulty or unable to perform\" and 3 representing \"no difficulty\", 3 questions each scored from 0-10 about pain, and 1 question scored 0-10 about function of the shoulder  The lower the score, the greater the functional disability. 100/100 represents no disability, pain or loss of function. Minimal clinically important difference is 11.4. Minimal detectable change is 12.1. MEDICAL NECESSITY:  
· Patient is expected to demonstrate progress in strength, range of motion, coordination and functional technique to increase independence with ADLs and functional tasks. REASON FOR SERVICES/OTHER COMMENTS: 
· Patient requires skilled physical therapy in order to return to prior level of function. Total Duration: 
  
 
Rehabilitation Potential For Stated Goals: Good Regarding Lidia Gaston's therapy, I certify that the treatment plan above will be carried out by a therapist or under their direction. Thank you for this referral, 
Antonia Richardson PT, DPT, OCS Referring Physician Signature: Tali Santiago MD _______________________________ Date _____________ PAIN/SUBJECTIVE:  
Initial:   5/10 Post Session:  3/10 HISTORY:  
History of Injury/Illness (Reason for Referral): 
Pt had surgery on L shoulder in Jan 2021, developed adhessions, is s/p L shoulder ANTHONY on 5/13/2021. Pt has limited ROM and is unable to complete functional tasks and necessary ADLs Past Medical History/Comorbidities:  
Mr. Teto Hannon  has a past medical history of Arthritis, Asthma, Hereditary sensory neuropathy, Hypertension, Lower leg DVT (deep venous thrombosis) ([facility] [address] (8/16/2009), Other ill-defined conditions(799.89), and Sleep apnea. He also has no past medical history of COPD or Unspecified adverse effect of anesthesia. Mr. Kuldeep Lemus  has a past surgical history that includes pr abdomen surgery proc unlisted; hx lipoma resection; and hx orthopaedic. Social History/Living Environment:  
  Lives with family Prior Level of Function/Work/Activity: 
Work:  Exercise Personal Factors:   
      Sex:  male Age:  61 y.o. Ambulatory/Rehab Services H2 Model Falls Risk Assessment Risk Factors: 
     (1)  Gender [Male] Ability to Rise from Chair: 
     (0)  Ability to rise in a single movement Falls Prevention Plan: No modifications necessary Total: (5 or greater = High Risk): 1 ©2010 [facility] [place] [address] [place] [address] States Patent #[id number]. Federal Law prohibits the replication, distribution or use without written permission from [facility] of "[employer] Current Medications:   
  
Current Outpatient Medications:  
  amLODIPine (NORVASC) 10 mg tablet, TAKE 1 TABLET BY MOUTH EVERY DAY, Disp: , Rfl:  
  ALPRAZolam (Xanax) 0.5 mg tablet, Take  by mouth nightly as needed for Anxiety. , Disp: , Rfl:  
  chlorthalidone (HYGROTON) 25 mg tablet, TAKE 1 TABLET BY MOUTH EVERY MORNING WITH FOOD, Disp: , Rfl:  
  cholecalciferol, vitamin D3, 50 mcg (2,000 unit) tab, Take  by mouth., Disp: , Rfl:  
  ascorbic acid, vitamin C, (Vitamin C) 500 mg tablet, Take  by mouth., Disp: , Rfl:  
  multivitamin (ONE A DAY) tablet, Take 1 Tab by mouth daily. , Disp: , Rfl:   
Date Last Reviewed:  05/14/21 Number of Personal Factors/Comorbidities that affect the Plan of Care: 1-2: MODERATE COMPLEXITY EXAMINATION:  
*= Painful     WNL= within normal limits     NT= not tested Observation Incisions well healed ROM 
Shoulder Right Left Flexion 175 127  133 IR L3 stomach ER T3 10 Strength (measured on MMT scale from 0-5/5) Right Left Shoulder Flexion 5 4 ABD 5 4 IR 5 4 ER 5 4  
 
 
Joint Mobility/Soft Tissue Description Joint Mobility Hypomobile all directions Soft Tissue Mobility TTP around shoulder Body Structures Involved: 1. Bones 2. Joints 3. Muscles 4. Ligaments Body Functions Affected: 1. Sensory/Pain 2. Neuromusculoskeletal 
3. Movement Related Activities and Participation Affected: 1. General Tasks and Demands 2. Self Care 3. Domestic Life 4. Interpersonal Interactions and Relationships 5. Community, Social and [place] Number of elements (examined above) that affect the Plan of Care: 1-2: LOW COMPLEXITY CLINICAL PRESENTATION:  
Presentation: Stable and uncomplicated: LOW COMPLEXITY CLINICAL DECISION MAKING:  
Use of outcome tool(s) and clinical judgement create a POC that gives a: Clear prediction of patient's progress: LOW COMPLEXITY Eda Wilcox PT, DPT, OCS

## 2021-05-26 NOTE — PROGRESS NOTES
Angel Puma  : 1965  Primary: [facility]*  Secondary:  Therapy Center at [facility] Therapy  [address], [address]  [phone]   [phone]         OUTPATIENT PHYSICAL THERAPY:Daily Note 2021      TREATMENT:   PT Patient Time In/Time Out  Time In: 3  Time Out: 7251        Visit Count:  26     ICD-10: Treatment Diagnosis: M25.512, N79.816I, Z47.89  Medication Last Reviewed: 21      TREATMENT PLAN  Effective Dates: 2021 TO 2021 (30 days). Frequency/Duration: 2-4 times a week for 30 Day(s)         Subjective: See Recertification Note dated 2021 for details  *It's locking back up almost.  I see George Promise tomorrow.  Pain: 3/10    Objective:  See Recertification Note dated 2021 for details      Therapeutic Exercise: (25 min) Done in order to improve strength, ROM and understanding of current condition. Date:   Date:  21 Date:   Date:     Activity/Exercise Parameters      Education Discussed examination findings, HEP, plan of care      Pulleys  5'     Seated B ER TB  Green 10x10\"     PNF D2 Ext       Prone up and arounds (shoulder work)  10x     UBE  4/4      Dowel rajinder reach up  10x5\"     Serratus punches supine  10x 10#                               Manual Therapy: (30 min) Done in order to improve joint and soft tissue mobility,reduce muscle guarding, and decrease muscle tone   Date:   Date: 21   Date:   Date:     Type Parameters      Joint Mobilization  PROM L UE all planes focusing on end range movement; Inferior grade IV mobs GH jt, APs Gr IV- L UE     Soft Tissue Mobilization           Modalities: (-) Done in order to reduce swelling and pain    Assessment:  See Recertification Note dated 2021 for details. I do feel like he is tightening up a bit, but fortunately he will be able to continue PT to address tightness to joint capsule.       Plan:  See Recertification Note dated 5/14/2021 for details    No future appointments.         54' Rx    PT Patient Time In/Time Out  Time In: 0143  Time Out: [address], DPT    Visit Approval Visit # Therapist initials Date A NS / Cx < 24 hr >24 hr Cx Comments    1 DAKOTA 5/14 [x]  [] [] Initial evaluation    2 Nick Feng 5.27.21 [] [] []        [] [] []        [] [] []        [] [] []        [] [] []        [] [] []        [] [] []        [] [] []        [] [] []    ONLY 12 VISITS APPROVED    [] [] []        [] [] []        [] [] []        [] [] []        [] [] []        [] [] []        [] [] []        [] [] []

## 2021-05-27 ENCOUNTER — HOSPITAL ENCOUNTER (OUTPATIENT)
Dept: PHYSICAL THERAPY | Age: 56
Discharge: HOME | End: 2021-05-27
Payer: COMMERCIAL

## 2021-05-27 PROCEDURE — 97110 THERAPEUTIC EXERCISES: CPT

## 2021-05-27 PROCEDURE — 97140 MANUAL THERAPY 1/> REGIONS: CPT

## 2021-06-01 NOTE — PROGRESS NOTES
Shaila Acevedo  : 1965  Primary: [facility]*  Secondary:  Therapy Center at [facility] Therapy  [address], [address]  [phone]   [phone]         OUTPATIENT PHYSICAL THERAPY:Daily Note 2021      TREATMENT:   PT Patient Time In/Time Out  Time In: 1345  Time Out: 1440        Visit Count:  27     ICD-10: Treatment Diagnosis: M25.512, U15.277E, Z47.89  Medication Last Reviewed: 21      TREATMENT PLAN  Effective Dates: 2021 TO 2021 (30 days). Frequency/Duration: 2-4 times a week for 30 Day(s)         Subjective: See Recertification Note dated 2021 for details  I got a Cortizone shot and that helps, but it's still a little stiff.  Pain: 2/10 \"It's been charla weird. It's low grade steady and wears me out. \"    Objective:  See Recertification Note dated 2021 for details      Therapeutic Exercise: (40 min) Done in order to improve strength, ROM and understanding of current condition. Date:   Date:  21 Date:  21 Date:     Activity/Exercise Parameters      Education Discussed examination findings, HEP, plan of care      Pulleys  5' 5'    Seated B ER TB  Green 10x10\" Green 10x10\"    PNF D2 Ext       Prone up and arounds (shoulder work)  10x 10x    UBE  /4  4/    Dowel rajinder reach up  10x5\" -    Serratus punches supine  10x 10#  10x10#    Rows - Bent over    Bent over 15# 20x                      Manual Therapy: (15 min) Done in order to improve joint and soft tissue mobility,reduce muscle guarding, and decrease muscle tone   Date:   Date: 21   Date:   21   Date:     Type Parameters      Joint Mobilization  PROM L UE all planes focusing on end range movement; Inferior grade IV mobs GH jt, APs Gr IV- L UE PROM L UE all planes focusing on end range movement;     Inferior grade IV mobs GH jt, APs Gr IV- L UE    Soft Tissue Mobilization           Modalities: (-) Done in order to reduce swelling and pain    Assessment:  See Recertification Note dated 5/14/2021 for details.    Continued strengthening and ROM - manual techniques help but still tight with ER the most.      Plan:  See Recertification Note dated 5/14/2021 for details    Future Appointments   Date Time Provider Steve Lissett   6/3/2021  1:45 PM Joel Rotunda, DPT [facility]   6/11/2021  8:45 AM Joel Rotunda, DPT [facility]   6/18/2021  8:45 AM Joel Rotunda, DPT [facility]   6/25/2021  8:45 AM Joel Rotunda, DPT [facility]           54' Rx    PT Patient Time In/Time Out  Time In: 1345  Time Out: [address]    Visit Approval Visit # Therapist initials Date A NS / Cx < 24 hr >24 hr Cx Comments    1 WJ 5/14 [x]  [] [] Initial evaluation    2 [address] 5.27.21 [x] [] []     3 [address] 6.2.21 [x] [] []        [] [] []        [] [] []        [] [] []        [] [] []        [] [] []        [] [] []        [] [] []    ONLY 12 VISITS APPROVED    [] [] []        [] [] []        [] [] []        [] [] []        [] [] []        [] [] []        [] [] []        [] [] []

## 2021-06-02 ENCOUNTER — HOSPITAL ENCOUNTER (OUTPATIENT)
Dept: PHYSICAL THERAPY | Age: 56
Discharge: HOME | End: 2021-06-02
Payer: COMMERCIAL

## 2021-06-02 PROCEDURE — 97110 THERAPEUTIC EXERCISES: CPT

## 2021-06-02 PROCEDURE — 97140 MANUAL THERAPY 1/> REGIONS: CPT

## 2021-06-03 ENCOUNTER — HOSPITAL ENCOUNTER (OUTPATIENT)
Dept: PHYSICAL THERAPY | Age: 56
Discharge: HOME | End: 2021-06-03
Payer: COMMERCIAL

## 2021-06-03 ENCOUNTER — APPOINTMENT (OUTPATIENT)
Dept: PHYSICAL THERAPY | Age: 56
End: 2021-06-03
Payer: COMMERCIAL

## 2021-06-03 PROCEDURE — 97140 MANUAL THERAPY 1/> REGIONS: CPT

## 2021-06-03 PROCEDURE — 97110 THERAPEUTIC EXERCISES: CPT

## 2021-06-03 NOTE — PROGRESS NOTES
Rui Hidalgo  : 1965  Primary: [facility]*  Secondary:  Therapy Center at [facility]  [address], [address]  [phone]   [phone]         OUTPATIENT PHYSICAL THERAPY:Daily Note 6/3/2021      TREATMENT:   PT Patient Time In/Time Out  Time In: 1345  Time Out: 1440        Visit Count:  28     ICD-10: Treatment Diagnosis: M25.512, K78.273K, Z47.89  Medication Last Reviewed: 21      TREATMENT PLAN  Effective Dates: 2021 TO 2021 (30 days). Frequency/Duration: 2-4 times a week for 30 Day(s)          Subjective: See Recertification Note dated 2021 for details     Pain: 1-2/10 \"I am tired today, but the pain isn't bad. It's real light. \"       Objective:  See Recertification Note dated 2021 for details      Therapeutic Exercise: (40 min) Done in order to improve strength, ROM and understanding of current condition. Date:   Date:  21 Date:  21 Date:  6.3.21   Activity/Exercise Parameters      Education Discussed examination findings, HEP, plan of care      Pulleys  5' 5' 5'   Seated B ER TB  Green 10x10\" Green 10x10\" Green 10x10\"   PNF D2 Ext       Prone up and arounds (shoulder work)  10x 10x -   UBE  4/4  4/4 4/4 UBE standing   Dowel rajinder reach up  10x5\" -    Serratus punches supine  10x 10#  10x10# 10x10\" 10#   Rows - Bent over    Bent over 15# 20x Bent over 15# 20x   Shrugs    15# 20x   Touchdowns    10x       Manual Therapy: (15 min) Done in order to improve joint and soft tissue mobility,reduce muscle guarding, and decrease muscle tone   Date:   Date: 21   Date:   21   Date:     Type Parameters      Joint Mobilization  PROM L UE all planes focusing on end range movement; Inferior grade IV mobs GH jt, APs Gr IV- L UE PROM L UE all planes focusing on end range movement;     Inferior grade IV mobs GH jt, APs Gr IV- L UE    Soft Tissue Mobilization           Modalities: (-) Done in order to reduce swelling and pain    Assessment:  See Recertification Note dated 5/14/2021 for details. Progressing slowly, the shot did help with joint mobility---strength is improving--PNF not fluid.       Plan:  See Recertification Note dated 5/14/2021 for details    Future Appointments   Date Time Provider Steve Lissett   6/11/2021  8:45 AM Miguelina Bunting, DPT [facility]   6/18/2021  8:45 AM Miguelina Bunting, DPT [facility]   6/25/2021  8:45 AM Miguelina Bunting, DPT [facility]           36' Rx    PT Patient Time In/Time Out  Time In: 1345  Time Out: [address]    Visit Approval Visit # Therapist initials Date A NS / Cx < 24 hr >24 hr Cx Comments    1 WJ 5/14 [x]  [] [] Initial evaluation    2 [address] 5.27.21 [x] [] []     3 [address] 6.2.21 [x] [] []     4 [address] 6.3.21 [x] [] []        [] [] []        [] [] []        [] [] []        [] [] []        [] [] []        [] [] []    ONLY 12 VISITS APPROVED    [] [] []        [] [] []        [] [] []        [] [] []        [] [] []        [] [] []        [] [] []        [] [] []

## 2021-06-10 ENCOUNTER — APPOINTMENT (OUTPATIENT)
Dept: PHYSICAL THERAPY | Age: 56
End: 2021-06-10
Payer: COMMERCIAL

## 2021-06-10 NOTE — PROGRESS NOTES
Kalyani Stabertha  : 1965  Primary: [facility]*  Secondary:  Therapy Center at [facility] Therapy  [address], [address]  [phone]   [phone]         OUTPATIENT PHYSICAL THERAPY:Daily Note 2021      TREATMENT:    PT Patient Time In/Time Out  Time In: 38  Time Out: 930        Visit Count:  29     ICD-10: Treatment Diagnosis: M25.512, Q75.842H, Z47.89  Medication Last Reviewed: 21      TREATMENT PLAN  Effective Dates: 2021 TO 2021 (30 days). Frequency/Duration: 2-4 times a week for 30 Day(s)          Subjective: See Recertification Note dated 2021 for details     Pain: 1-2/10 \"I've been been charla lazy with my exercises\"       Objective:  See Recertification Note dated 2021 for details      Therapeutic Exercise: (15 min) Done in order to improve strength, ROM and understanding of current condition. Date:   Date:  21 Date:  21 Date:  6.3.21 Date:  21   Activity/Exercise Parameters       Education Discussed examination findings, HEP, plan of care       Pulleys  5' 5' 5' 5'   Seated B ER TB  Green 10x10\" Green 10x10\" Green 10x10\"    PNF D2 Ext        Prone up and arounds (shoulder work)  10x 10x -    UBE  4/4  4/4 4/4 UBE standing 4/4 UBE standing   Dowel rajinder reach up  10x5\" -     Serratus punches supine  10x 10#  10x10# 10x10\" 10# 10x10\" 10#   Rows - Bent over    Bent over 15# 20x Bent over 15# 20x 15# 20x   Shrugs    15# 20x 15# 20x   Touchdowns    10x        Manual Therapy: (25 min) Done in order to improve joint and soft tissue mobility,reduce muscle guarding, and decrease muscle tone   Date:   Date: 21   Date:   21   Date:  21   Type Parameters      Joint Mobilization  PROM L UE all planes focusing on end range movement; Inferior grade IV mobs WOO feng, Debra Gr IV- L UE PROM L UE all planes focusing on end range movement;     Inferior grade IV mobs WOO feng, Debra Gr IV- L UE PROM L UE all planes focusing on end range movement; Inferior grade IV mobs GH jt, APs    Soft Tissue Mobilization       Gr IV- L UE    Modalities: (-) Done in order to reduce swelling and pain    Assessment:  See Recertification Note dated 5/14/2021 for details. Showed wife how to perform PROM.      Plan:  See Recertification Note dated 5/14/2021 for details    Future Appointments   Date Time Provider Steve Galeana   6/18/2021  8:45 AM MILTON Chow   6/25/2021  8:45 AM Bri Manuel DPT [facility]           36' Rx    PT Patient Time In/Time Out  Time In: 0850  Time Out: [address], DPT    Visit Approval Visit # Therapist initials Date A NS / Cx < 24 hr >24 hr Cx Comments    1 WJ 5/14 [x]  [] [] Initial evaluation    2 [address] 5.27.21 [x] [] []     3 [address] 6.2.21 [x] [] []     4 [address] 6.3.21 [x] [] []     5 [address] 6.11.21 [x] [] []        [] [] []        [] [] []        [] [] []        [] [] []        [] [] []    ONLY 12 VISITS APPROVED    [] [] []        [] [] []        [] [] []        [] [] []        [] [] []        [] [] []        [] [] []        [] [] []

## 2021-06-11 ENCOUNTER — HOSPITAL ENCOUNTER (OUTPATIENT)
Dept: PHYSICAL THERAPY | Age: 56
Discharge: HOME | End: 2021-06-11
Payer: COMMERCIAL

## 2021-06-11 PROCEDURE — 97140 MANUAL THERAPY 1/> REGIONS: CPT

## 2021-06-11 PROCEDURE — 97110 THERAPEUTIC EXERCISES: CPT

## 2021-06-14 ENCOUNTER — HOSPITAL ENCOUNTER (OUTPATIENT)
Dept: PHYSICAL THERAPY | Age: 56
Discharge: HOME | End: 2021-06-14
Payer: COMMERCIAL

## 2021-06-14 PROCEDURE — 97140 MANUAL THERAPY 1/> REGIONS: CPT

## 2021-06-14 PROCEDURE — 97110 THERAPEUTIC EXERCISES: CPT

## 2021-06-14 NOTE — PROGRESS NOTES
Shy Reyes  : 1965  Primary:   Secondary:  [facility] Therapy  [address], [facility], [address]  [phone]   [phone]         OUTPATIENT PHYSICAL THERAPY:Daily Note 2021      TREATMENT:    PT Patient Time In/Time Out  Time In: 1515  Time Out: 1600        Visit Count:  30     ICD-10: Treatment Diagnosis: M25.512, K15.142P, Z47.89  Medication Last Reviewed: 21      TREATMENT PLAN  Effective Dates: 2021 TO 21 (adding 2 weeks to last recert from 17 to 21) . Frequency/Duration: 2-4 times a week for 14 Day(s) ending 21  Frequency/Duration: 2-4 times a week for 30 Day(s)          Subjective: See Recertification Note dated 21 for details     Pain: -2/10 I think I overworked it over the weekend--it's right.      Objective:  See Recertification Note dated 2021 for details      Therapeutic Exercise: (15 min) Done in order to improve strength, ROM and understanding of current condition.     Date:   Date:  21 Date:  21 Date:  6.3.21 Date:  21 Date:  21   Activity/Exercise Parameters        Education Discussed examination findings, HEP, plan of care        Pulleys  5' 5' 5' 5' 5'   Seated B ER TB  Green 10x10\" Green 10x10\" Green 10x10\"     PNF D2 Ext         Prone up and arounds (shoulder work)  10x 10x -  10x   UBE  4/4  4/4 4/4 UBE standing 4/4 UBE standing 4/4 UBE standing with resistance   Dowel rajinder reach up  10x5\" -      Serratus punches supine  10x 10#  10x10# 10x10\" 10# 10x10\" 10# 10x10\" 10#    Rows - Bent over    Bent over 15# 20x Bent over 15# 20x 15# 20x 15# 30x   Shrugs    15# 20x 15# 20x 15# 30x   Touchdowns    10x  10x        Manual Therapy: (25 min) Done in order to improve joint and soft tissue mobility,reduce muscle guarding, and decrease muscle tone   Date:   Date: 21   Date:   21   Date:  21   Type Parameters      Joint Mobilization  PROM L UE all planes focusing on end range movement; Inferior grade IV mobs GH jt, APs Gr IV- L UE PROM L UE all planes focusing on end range movement; Inferior grade IV mobs GH jt, APs Gr IV- L UE PROM L UE all planes focusing on end range movement; Inferior grade IV mobs GH jt, APs    Soft Tissue Mobilization       Gr IV- L UE    Modalities: (-) Done in order to reduce swelling and pain    Assessment:  See Recertification Note dated 6/14/2021  for details. Stretched him more aggressively today to address tightnesss. Will see how this affects his ROM next visit.      Plan:  See Recertification Note dated 6/14/2021  for details    Future Appointments   Date Time Provider Steve Galeana   6/16/2021  8:45 AM Herman Mcmahan DPT [facility]   6/18/2021  8:45 AM Herman Mcmahan DPT [facility]   6/25/2021  8:45 AM Herman Mcmahan DPT [facility]           36' Rx    PT Patient Time In/Time Out  Time In: 1515  Time Out: [address], DPT    Visit Approval Visit # Therapist initials Date A NS / Cx < 24 hr >24 hr Cx Comments    1 WJ 5/14 [x]  [] [] Initial evaluation    2 Reginia American 5.27.21 [x] [] []     3 Reginia American 6.2.21 [x] [] []     4 Reginia American 6.3.21 [x] [] []     5 Reginia American 6.11.21 [x] [] []     6 [facility] 4.26.54 [x] [] [] RECERT For POC Dates       [] [] []        [] [] []        [] [] []        [] [] []    ONLY 12 VISITS APPROVED    [] [] []        [] [] []        [] [] []        [] [] []        [] [] []        [] [] []        [] [] []        [] [] []

## 2021-06-14 NOTE — THERAPY RECERTIFICATION
Kalyani Reeder  : 1965  Primary:   Secondary:  [address] [place]  at [address]  [address], [address]  [phone]   [phone]          OUTPATIENT PHYSICAL THERAPY:Recertification    ICD-10: Treatment Diagnosis: M25.512, V17.516I, Z47.89  Precautions/Allergies:   Patient has no known allergies. TREATMENT PLAN:  Effective Dates: 2021 TO 21 (adding 2 weeks to last recert from  to 21) . Frequency/Duration: 2-4 times a week for 14 Day(s) ending 21 MEDICAL/REFERRING DIAGNOSIS:  Encounter for other orthopedic aftercare [Z47.89]   DATE OF ONSET: 2021  REFERRING PHYSICIAN: Ceasar Foster MD MD Orders: Niurka Mcintyre and treat  Return MD Appointment:    RECERTIFICATION NOTE:  Kalyani Reeder has attended  30 visits. He has done very well with compliant to PT. [place] is going out [place] and I highly recommend him extending POC for 2 weeks so he can regain as much motion as possible [facility]. ROM is stiff with ER and I feel that if he does not have these visits prior to leaving the country he will lock back up and lose ROM or plateau. Currently L shoulder flexion 155 degrees with slight decreased scapulohumeral rhythm. ER is lacking 20 degrees from full. He has only 3 more visits scheduled and we will likely DC case at this time. PROBLEM LIST (Impacting functional limitations):  1. Decreased Strength  2. Decreased ADL/Functional Activities  3. Increased Pain  4. Decreased Activity Tolerance  5. Decreased Flexibility/Joint Mobility  6. Decreased Knowledge of Precautions  7. Decreased [place] with Home Exercise Program INTERVENTIONS PLANNED: (Treatment may consist of any combination of the following)  1. Cold  2. Heat  3. Home Exercise Program (HEP)  4. Manual Therapy  5. Neuromuscular Re-education/Strengthening  6. Range of Motion (ROM)  7.  Therapeutic Activites  8. Therapeutic Exercise/Strengthening     GOALS: (Goals have been discussed and agreed upon with patient.)  Discharge Goals: Time Frame: 5 weeks  1. Pt will be compliant with progressive HEP  GOAL MET 6/14/2021   2. Pt will have L shoulder flexion > 165 deg GOAL MET 6/14/2021   3. Pt will have L shoulder ER > 75 degrees (NOT MET)    OUTCOME MEASURE:   OUTCOME: PEDRO Shoulder Score  Initial Score: 51/100 Most Recent: 51/100 (Date: XX/XX)   Interpretation of Score: 20 questions each scored on a 3 point scale with 0 representing \"extreme difficulty or unable to perform\" and 3 representing \"no difficulty\", 3 questions each scored from 0-10 about pain, and 1 question scored 0-10 about function of the shoulder  The lower the score, the greater the functional disability. 100/100 represents no disability, pain or loss of function. Minimal clinically important difference is 11.4. Minimal detectable change is 12.1. MEDICAL NECESSITY:   · Patient is expected to demonstrate progress in strength, range of motion, coordination and functional technique to increase independence with ADLs and functional tasks. REASON FOR SERVICES/OTHER COMMENTS:  · Patient requires skilled physical therapy in order to return to prior level of function. Total Duration:       Rehabilitation Potential For Stated Goals: Good  Regarding Senait Gaston's therapy, I certify that the treatment plan above will be carried out by a therapist or under their direction.   Thank you for this referral,  Radha Gallo PT, DPT, OCS  Referring Physician Signature: Rajani Lott MD _______________________________ Date _____________     PAIN/SUBJECTIVE:   Initial:   5/10 Post Session:  3/10   EXAMINATION:   *= Painful     WNL= within normal limits     NT= not tested    Observation  Incisions well healed    ROM  Shoulder   Right Left   Flexion 175 155 deg    145 deg   IR L3 L3   ER T3 Lacking 20 degrees       Strength (measured on MMT scale from 0-5/5)   Right Left   Shoulder         Flexion 5 4       ABD 5 4       IR 5 4       ER 5 4       Joint Mobility/Soft Tissue   Description   Joint Mobility Hypomobile all directions   Soft Tissue Mobility TTP around shoulder        Juan Manuel Murillo DPT

## 2021-06-16 ENCOUNTER — HOSPITAL ENCOUNTER (OUTPATIENT)
Dept: PHYSICAL THERAPY | Age: 56
Discharge: HOME | End: 2021-06-16
Payer: COMMERCIAL

## 2021-06-16 PROCEDURE — 97110 THERAPEUTIC EXERCISES: CPT

## 2021-06-16 PROCEDURE — 97140 MANUAL THERAPY 1/> REGIONS: CPT

## 2021-06-16 NOTE — PROGRESS NOTES
Bridgette Cody  : 1965  Primary:   Secondary:  [address] at [facility] Therapy  [address] [facility], [facility], [address]  [phone]   [phone]         OUTPATIENT PHYSICAL THERAPY:Daily Note 2021      TREATMENT:    PT Patient Time In/Time Out  Time In: 08  Time Out: 940        Visit Count:  31     ICD-10: Treatment Diagnosis: M25.512, A65.582X, Z47.89  Medication Last Reviewed: 21      TREATMENT PLAN  Effective Dates: 2021 TO 21 (adding 2 weeks to last recert from  to 21) . Frequency/Duration: 2-4 times a week for 14 Day(s) ending 21  Frequency/Duration: 2-4 times a week for 30 Day(s)          Subjective: See Recertification Note dated 21 for details     Pain: I am doing my exercises, it's tight but it's getting better        Objective:  See Recertification Note dated 2021 for details      Therapeutic Exercise: (25 min) Done in order to improve strength, ROM and understanding of current condition.     Date:   Date:  21 Date:  21 Date:  6.3.21 Date:  21 Date:  21 Date:  21   Activity/Exercise Parameters         Education Discussed examination findings, HEP, plan of care         Pulleys  5' 5' 5' 5' 5'    Seated B ER TB  Green 10x10\" Green 10x10\" Green 10x10\"      PNF D2 Ext          Prone up and arounds (shoulder work)  10x 10x -  10x 10x   UBE  4/4  4/4 4/4 UBE standing 4/4 UBE standing 4/4 UBE standing with resistance 4/4 ube standing with resistance   Dowel rajinder reach up  10x5\" -       Serratus punches supine  10x 10#  10x10# 10x10\" 10# 10x10\" 10# 10x10\" 10#  10x10 10#   Rows - Bent over    Bent over 15# 20x Bent over 15# 20x 15# 20x 15# 30x 15# 30x   Shrugs    15# 20x 15# 20x 15# 30x 15# 30x   Touchdowns    10x  10x  10x       Manual Therapy: (30 min) Done in order to improve joint and soft tissue mobility,reduce muscle guarding, and decrease muscle tone   Date:   Date: 21 Date:   6/2/21   Date:  6/11/21   Type Parameters      Joint Mobilization  PROM L UE all planes focusing on end range movement; Inferior grade IV mobs GH jt, APs Gr IV- L UE PROM L UE all planes focusing on end range movement; Inferior grade IV mobs GH jt, APs Gr IV- L UE PROM L UE all planes focusing on end range movement; Inferior grade IV mobs GH jt, APs    Soft Tissue Mobilization       Gr IV- L UE    Modalities: (-) Done in order to reduce swelling and pain    Assessment:  Doing well, ER is improving. Joint capsult still tight at end range.      Plan:  See Recertification Note dated 6/16/2021  for details    Future Appointments   Date Time Provider Steve Galeana   6/18/2021  8:45 AM Onita Plaster, DPT [facility]   6/25/2021  8:45 AM Onita Plaster, DPT [facility]           54' Rx    PT Patient Time In/Time Out  Time In: 0845  Time Out: [address] [facility] Lisset More DPT    Visit Approval Visit # Therapist initials Date A NS / Cx < 24 hr >24 hr Cx Comments    1 WJ 5/14 [x]  [] [] Initial evaluation    2 Kathia Bar 5.27.21 [x] [] []     3 Kathia Bar 6.2.21 [x] [] []     4 Kathia Bar 6.3.21 [x] [] []     5 Kathia Bar 6.11.21 [x] [] []     6 Kathia Bar 6.25.79 [x] [] [] RECERT For POC Dates    7 Kathia Bar 6.16.21 [] [] []        [] [] []        [] [] []        [] [] []    ONLY 12 VISITS APPROVED    [] [] []        [] [] []        [] [] []        [] [] []        [] [] []        [] [] []        [] [] []        [] [] []

## 2021-06-17 ENCOUNTER — APPOINTMENT (OUTPATIENT)
Dept: PHYSICAL THERAPY | Age: 56
End: 2021-06-17
Payer: COMMERCIAL

## 2021-06-18 ENCOUNTER — HOSPITAL ENCOUNTER (OUTPATIENT)
Dept: PHYSICAL THERAPY | Age: 56
Discharge: HOME | End: 2021-06-18
Payer: COMMERCIAL

## 2021-06-18 PROCEDURE — 97140 MANUAL THERAPY 1/> REGIONS: CPT

## 2021-06-18 PROCEDURE — 97110 THERAPEUTIC EXERCISES: CPT

## 2021-06-18 NOTE — THERAPY DISCHARGE
Mabel Oliver  : 1965  Primary:   Secondary:  [address] at [facility]  [address], [facility], [address]  [phone]   [phone]          OUTPATIENT PHYSICAL THERAPY:Discharge Summary 2021   ICD-10: Treatment Diagnosis: M25.512, O22.771H, Z47.89  Precautions/Allergies:   Patient has no known allergies. TREATMENT PLAN:  Effective Dates: 2021 TO 21 (adding 2 weeks to last recert from  to 21) . Frequency/Duration: 2-4 times a week for 14 Day(s) ending 21 MEDICAL/REFERRING DIAGNOSIS:  Encounter for other orthopedic aftercare [Z47.89]   DATE OF ONSET: 2021  REFERRING PHYSICIAN: Isabel Sanchez MD MD Orders: Gabriele Zamarripa and treat  Return MD Appointment:            Celina Sanchez for [facility]) next week--will DC case at this time. Goals met as seen below. Thank you for the referral.    RECERTIFICATION NOTE:  Mabel Oliver has attended  28 visits. He has done very well with compliant to PT. He is going out of the country and I highly recommend him extending POC for 2 weeks so he can regain as much motion as possible prior to leaving for [facility]) next week. ROM is stiff with ER and I feel that if he does not have these visits prior to leaving the country he will lock back up and lose ROM or plateau. Currently L shoulder flexion 155 degrees with slight decreased scapulohumeral rhythm. ER is lacking 20 degrees from full. He has only 3 more visits scheduled and we will likely DC case at this time. PROBLEM LIST (Impacting functional limitations):  1. Decreased Strength  2. Decreased ADL/Functional Activities  3. Increased Pain  4. Decreased Activity Tolerance  5. Decreased Flexibility/Joint Mobility  6. Decreased Knowledge of Precautions  7. Decreased [place] with Home Exercise Program INTERVENTIONS PLANNED: (Treatment may consist of any combination of the following)  1. Cold  2. Heat  3.  Home Exercise Program (HEP)  4. Manual Therapy  5. Neuromuscular Re-education/Strengthening  6. Range of Motion (ROM)  7. Therapeutic Activites  8. Therapeutic Exercise/Strengthening     GOALS: (Goals have been discussed and agreed upon with patient.)  Discharge Goals: Time Frame: 5 weeks  1. Pt will be compliant with progressive HEP  GOAL MET 6/18/2021   2. Pt will have L shoulder flexion > 165 deg GOAL MET 6/18/2021   3. Pt will have L shoulder ER > 75 degrees (NOT MET)    OUTCOME MEASURE:   OUTCOME: PEDRO Shoulder Score  Initial Score: 51/100 Most Recent: 51/100 (Date: XX/XX)   Interpretation of Score: 20 questions each scored on a 3 point scale with 0 representing \"extreme difficulty or unable to perform\" and 3 representing \"no difficulty\", 3 questions each scored from 0-10 about pain, and 1 question scored 0-10 about function of the shoulder  The lower the score, the greater the functional disability. 100/100 represents no disability, pain or loss of function. Minimal clinically important difference is 11.4. Minimal detectable change is 12. 1. ·   Total Duration:  PT Patient Time In/Time Out  Time In: 0845  Time Out: 0930    Rehabilitation Potential For Stated Goals: Good  Regarding Masheryl Ale Gaston's therapy, I certify that the treatment plan above will be carried out by a therapist or under their direction.   Thank you for this referral,  Gwendolyn Back PT, DPT, OCS  Referring Physician Signature: Myla Marques MD      PAIN/SUBJECTIVE:   Initial:   5/10 Post Session:  3/10   EXAMINATION:   *= Painful     WNL= within normal limits     NT= not tested    Observation  Incisions well healed    ROM  Shoulder   Right Left   Flexion 175 155 deg    145 deg   IR L3 L3   ER T3 Lacking 20 degrees       Strength (measured on MMT scale from 0-5/5)   Right Left   Shoulder         Flexion 5 4       ABD 5 4       IR 5 4       ER 5 4       Joint Mobility/Soft Tissue   Description   Joint Mobility Hypomobile all directions   Soft Tissue Mobility TTP around shoulder        Gilles Duke DPT

## 2021-06-18 NOTE — PROGRESS NOTES
Yary Moreira  : 1965  Primary:   Secondary:  [address]  at [place] [address] Therapy  [address], [address]  [phone]   [phone]         OUTPATIENT PHYSICAL THERAPY:Daily Note 2021      TREATMENT:    PT Patient Time In/Time Out  Time In: 08  Time Out: 930        Visit Count:  32     ICD-10: Treatment Diagnosis: M25.512, L47.762T, Z47.89  Medication Last Reviewed: 21      TREATMENT PLAN  Effective Dates: 2021 TO 21 (adding 2 weeks to last recert from 49 to 21) . Frequency/Duration: 2-4 times a week for 14 Day(s) ending 21  Frequency/Duration: 2-4 times a week for 30 Day(s)          Subjective: See Recertification Note dated 21 for details     Pain: I leave for [facility] next week. Will DC case today.      Objective:  See Recertification Note dated 2021 for details Will DC today    Therapeutic Exercise: (25 min) Done in order to improve strength, ROM and understanding of current condition.     Date:   Date:  21 Date:  21 Date:  6.3.21 Date:  21 Date:  21 Date:  21 Date:  21   Activity/Exercise Parameters          Education Discussed examination findings, HEP, plan of care          Pulleys  5' 5' 5' 5' 5'     Seated B ER TB  Green 10x10\" Green 10x10\" Green 10x10\"       PNF D2 Ext           Prone up and arounds (shoulder work)  10x 10x -  10x 10x 10x   UBE  4/4  4/4 4/4 UBE standing 4/4 UBE standing 4/4 UBE standing with resistance 4/4 ube standing with resistance 4/4 ube standing with resistance   Dowel rajinder reach up  10x5\" -        Serratus punches supine  10x 10#  10x10# 10x10\" 10# 10x10\" 10# 10x10\" 10#  10x10 10# 10x10\" 10#   Rows - Bent over    Bent over 15# 20x Bent over 15# 20x 15# 20x 15# 30x 15# 30x 15# 30x   Shrugs    15# 20x 15# 20x 15# 30x 15# 30x 15# 30x   Touchdowns    10x  10x  10x 10x       Manual Therapy: (15 min) Done in order to improve joint and soft tissue mobility,reduce muscle guarding, and decrease muscle tone   Date:   Date: 5.27.21   Date:   6/2/21   Date:  6/11/21   Type Parameters      Joint Mobilization  PROM L UE all planes focusing on end range movement; Inferior grade IV mobs GH jt, APs Gr IV- L UE PROM L UE all planes focusing on end range movement; Inferior grade IV mobs GH jt, APs Gr IV- L UE PROM L UE all planes focusing on end range movement; Inferior grade IV mobs GH jt, APs    Soft Tissue Mobilization       Gr IV- L UE    Modalities: (-) Done in order to reduce swelling and pain    Assessment:  Doing well, ER is improving. Joint capsult still tight at end range.      Plan:  Yoselin 39 this visit    Future Appointments   Date Time Provider Steve Galeana   6/18/2021  8:45 AM MILTON [facility]   6/25/2021  8:45 AM SEPIDEH AcT [facility]           36' Rx    PT Patient Time In/Time Out  Time In: 0845  Time Out: Gerda 350, DPT    Visit Approval Visit # Therapist initials Date A NS / Cx < 24 hr >24 hr Cx Comments    1 WJ 5/14 [x]  [] [] Initial evaluation    2 Johnny Shove 5.27.21 [x] [] []     3 Johnny Shove 6.2.21 [x] [] []     4 Johnny Shove 6.3.21 [x] [] []     5 Johnny Shove 6.11.21 [x] [] []     6 Johnny Shove 3.43.40 [x] [] [] RECERT For POC Dates    7 Johnny Shove 6.16.21 [x] [] []     8 Johnny Shove 6.18.21 [x] [] []        [] [] []        [] [] []    ONLY 12 VISITS APPROVED    [] [] []        [] [] []        [] [] []        [] [] []        [] [] []        [] [] []        [] [] []        [] [] []

## 2021-06-24 ENCOUNTER — APPOINTMENT (OUTPATIENT)
Dept: PHYSICAL THERAPY | Age: 56
End: 2021-06-24
Payer: COMMERCIAL

## 2021-06-25 ENCOUNTER — APPOINTMENT (OUTPATIENT)
Dept: PHYSICAL THERAPY | Age: 56
End: 2021-06-25
Payer: COMMERCIAL

## 2022-06-05 ENCOUNTER — HOSPITAL ENCOUNTER (EMERGENCY)
Age: 57
Discharge: HOME | End: 2022-06-05
Attending: EMERGENCY MEDICINE
Payer: COMMERCIAL

## 2022-06-05 ENCOUNTER — APPOINTMENT (OUTPATIENT)
Dept: CT IMAGING | Age: 57
End: 2022-06-05
Payer: COMMERCIAL

## 2022-06-05 VITALS
HEIGHT: 72 IN | HEART RATE: 65 BPM | TEMPERATURE: 98.1 F | RESPIRATION RATE: 18 BRPM | OXYGEN SATURATION: 98 % | BODY MASS INDEX: 25.06 KG/M2 | SYSTOLIC BLOOD PRESSURE: 132 MMHG | WEIGHT: 185 LBS | DIASTOLIC BLOOD PRESSURE: 81 MMHG

## 2022-06-05 DIAGNOSIS — J01.00 ACUTE MAXILLARY SINUSITIS, RECURRENCE NOT SPECIFIED: Primary | ICD-10-CM

## 2022-06-05 LAB
ANION GAP SERPL CALC-SCNC: 13 MMOL/L (ref 7–16)
BASOPHILS # BLD: 0.1 K/UL (ref 0–0.2)
BASOPHILS NFR BLD: 2 % (ref 0–2)
BUN SERPL-MCNC: 13 MG/DL (ref 7–18)
CALCIUM SERPL-MCNC: 9 MG/DL (ref 8.3–10.4)
CHLORIDE SERPL-SCNC: 103 MMOL/L (ref 98–107)
CO2 SERPL-SCNC: 25 MMOL/L (ref 21–32)
CREAT SERPL-MCNC: 0.8 MG/DL (ref 0.8–1.5)
DIFFERENTIAL METHOD BLD: ABNORMAL
EOSINOPHIL # BLD: 0.2 K/UL (ref 0–0.8)
EOSINOPHIL NFR BLD: 3 % (ref 0.5–7.8)
ERYTHROCYTE [DISTWIDTH] IN BLOOD BY AUTOMATED COUNT: 12.8 % (ref 11.9–14.6)
GLUCOSE SERPL-MCNC: 96 MG/DL (ref 65–100)
HCT VFR BLD AUTO: 42.9 % (ref 41.1–50.3)
HGB BLD-MCNC: 14.7 G/DL (ref 13.6–17.2)
IMM GRANULOCYTES # BLD AUTO: 0 K/UL (ref 0–0.5)
IMM GRANULOCYTES NFR BLD AUTO: 0 % (ref 0–5)
LYMPHOCYTES # BLD: 1.6 K/UL (ref 0.5–4.6)
LYMPHOCYTES NFR BLD: 26 % (ref 13–44)
MCH RBC QN AUTO: 29.9 PG (ref 26.1–32.9)
MCHC RBC AUTO-ENTMCNC: 34.3 G/DL (ref 31.4–35)
MCV RBC AUTO: 87.2 FL (ref 79.6–97.8)
MONOCYTES # BLD: 0.8 K/UL (ref 0.1–1.3)
MONOCYTES NFR BLD: 13 % (ref 4–12)
NEUTS SEG # BLD: 3.3 K/UL (ref 1.7–8.2)
NEUTS SEG NFR BLD: 56 % (ref 43–78)
NRBC # BLD: 0 K/UL (ref 0–0.2)
PLATELET # BLD AUTO: 218 K/UL (ref 150–450)
PMV BLD AUTO: 12.5 FL (ref 9.4–12.3)
POTASSIUM SERPL-SCNC: 3.5 MMOL/L (ref 3.5–5.1)
RBC # BLD AUTO: 4.92 M/UL (ref 4.23–5.6)
SODIUM SERPL-SCNC: 141 MMOL/L (ref 136–145)
WBC # BLD AUTO: 5.9 K/UL (ref 4.3–11.1)

## 2022-06-05 PROCEDURE — 80048 BASIC METABOLIC PNL TOTAL CA: CPT

## 2022-06-05 PROCEDURE — 99284 EMERGENCY DEPT VISIT MOD MDM: CPT

## 2022-06-05 PROCEDURE — 85025 COMPLETE CBC W/AUTO DIFF WBC: CPT

## 2022-06-05 PROCEDURE — 70486 CT MAXILLOFACIAL W/O DYE: CPT

## 2022-06-05 RX ORDER — DOXYCYCLINE HYCLATE 100 MG
100 TABLET ORAL 2 TIMES DAILY
Qty: 28 TABLET | Refills: 0 | Status: SHIPPED | OUTPATIENT
Start: 2022-06-05 | End: 2022-06-19

## 2022-06-05 RX ORDER — PREDNISONE 50 MG/1
50 TABLET ORAL DAILY
Qty: 5 TABLET | Refills: 0 | Status: SHIPPED | OUTPATIENT
Start: 2022-06-05 | End: 2022-06-10

## 2022-06-05 ASSESSMENT — ENCOUNTER SYMPTOMS
SORE THROAT: 0
RHINORRHEA: 0
SINUS PRESSURE: 0
SINUS PAIN: 0
FACIAL SWELLING: 1
VOICE CHANGE: 0
EYE PAIN: 0
TROUBLE SWALLOWING: 0

## 2022-06-05 ASSESSMENT — PAIN DESCRIPTION - DESCRIPTORS: DESCRIPTORS: PRESSURE;TENDER

## 2022-06-05 ASSESSMENT — PAIN DESCRIPTION - LOCATION: LOCATION: FACE

## 2022-06-05 ASSESSMENT — PAIN - FUNCTIONAL ASSESSMENT: PAIN_FUNCTIONAL_ASSESSMENT: 0-10

## 2022-06-05 ASSESSMENT — PAIN SCALES - GENERAL: PAINLEVEL_OUTOF10: 5

## 2022-06-05 NOTE — ED NOTES
I have reviewed discharge instructions with the patient. The patient verbalized understanding. Patient left ED via Discharge Method: ambulatory to Home with spouse. Opportunity for questions and clarification provided. Patient given 2 scripts. To continue your aftercare when you leave the hospital, you may receive an automated call from our care team to check in on how you are doing. This is a free service and part of our promise to provide the best care and service to meet your aftercare needs.  If you have questions, or wish to unsubscribe from this service please call [phone]. Thank you for Choosing our [facility] Emergency Department.           Sylvia Boogie RN  06/05/22 5942

## 2022-06-05 NOTE — ED TRIAGE NOTES
Pt ambulatory from lobby to triage. Pt states he was dx with cellulitis on his face by his PCP on Thursday. Pt was placed on bactrim, but states the rash and swelling is spreading up toward his eyes. Pt states his PCP advised him to come to the ER for blood work. Denies fevers. Masked.

## 2022-06-05 NOTE — ED PROVIDER NOTES
[facility] Emergency Department Provider Note                   PCP:                Barbara Hernandez MD               Age: 61 y.o. Sex: male     No diagnosis found. DISPOSITION         New Prescriptions    No medications on file       Orders Placed This Encounter   Procedures    CT SINUS WO CONTRAST    CBC with Auto Differential    Basic Metabolic Panel        MDM  Number of Diagnoses or Management Options     Amount and/or Complexity of Data Reviewed  Clinical lab tests: ordered and reviewed  Tests in the radiology section of CPT®: ordered and reviewed  Independent visualization of images, tracings, or specimens: yes    Risk of Complications, Morbidity, and/or Mortality  Presenting problems: moderate  Diagnostic procedures: moderate  Management options: moderate    Patient Progress  Patient progress: stable       Melvi Lucia is a 61 y.o. male who presents to the Emergency Department with chief complaint of    Chief Complaint   Patient presents with    Facial Swelling    Rash      Patient presents to the ER on instruction from primary care physician due to facial swelling and presumed facial infection. Has been taking Bactrim for a few days but states it seems to be getting worse. He reports swelling across the nose and maxilla bilaterally. He denies any fever or chills. He does report chronic nasal congestion with a deviated septum. He denies any headache or pain with ocular motion. Denies sore throat and review of systems otherwise negative. The history is provided by the patient. All other systems reviewed and are negative. Review of Systems   Constitutional: Negative for chills and fever. HENT: Positive for congestion and facial swelling. Negative for ear pain, mouth sores, nosebleeds, postnasal drip, rhinorrhea, sinus pressure, sinus pain, sore throat, trouble swallowing and voice change. Eyes: Negative for pain. All other systems reviewed and are negative.       Past Medical History:   Diagnosis Date    Arthritis     O. A.    Asthma     Hereditary sensory neuropathy     [facility]    Hypertension     Lower leg DVT (deep venous thrombosis) ([address] 8/16/2009    LLE    Other ill-defined conditions(799.89)     pernicious anemia-b12 shots once momthly    Sleep apnea     CPAP        Past Surgical History:   Procedure Laterality Date    LIPOMA RESECTION      ORTHOPEDIC SURGERY      knee artho.  VT ABDOMEN SURGERY PROC UNLISTED      LIH        Family History   Problem Relation Age of Onset    Heart Disease Father     Cancer Father     Heart Attack Mother         x7-8+    Seizures Mother     Heart Disease Mother     Diabetes Mother     Cancer Mother         kidney    Hypertension Mother     Cancer Paternal Uncle     Heart Attack Father            Social Connections:     Frequency of Communication with Friends and Family: Not on file    Frequency of Social Gatherings with Friends and Family: Not on file    Attends Zoroastrianism Services: Not on file    Active Member of Clubs or Organizations: Not on file    Attends Club or Organization Meetings: Not on file    Marital Status: Not on file        No Known Allergies     Vitals signs and nursing note reviewed. Patient Vitals for the past 4 hrs:   Temp Pulse Resp BP SpO2   06/05/22 1010 98.1 °F (36.7 °C) 64 18 130/83 98 %          Physical Exam  Vitals and nursing note reviewed. Constitutional:       General: He is not in acute distress. Appearance: Normal appearance. He is not ill-appearing, toxic-appearing or diaphoretic. HENT:      Head: Normocephalic and atraumatic. Comments: Facial examination is fairly unremarkable there might be some mild edema across the front of the face and over the bridge of the nose with some tenderness but no significant erythema is noted.      Right Ear: Tympanic membrane, ear canal and external ear normal.      Left Ear: Tympanic membrane, ear canal and external ear normal. Nose: Nose normal. No congestion. Mouth/Throat:      Mouth: Mucous membranes are moist.      Pharynx: Oropharynx is clear. No oropharyngeal exudate or posterior oropharyngeal erythema. Eyes:      Extraocular Movements: Extraocular movements intact. Conjunctiva/sclera: Conjunctivae normal.      Pupils: Pupils are equal, round, and reactive to light. Cardiovascular:      Rate and Rhythm: Normal rate. Pulmonary:      Effort: Pulmonary effort is normal.   Musculoskeletal:         General: Normal range of motion. Cervical back: Normal range of motion. No rigidity or tenderness. Lymphadenopathy:      Cervical: No cervical adenopathy. Skin:     General: Skin is warm. Capillary Refill: Capillary refill takes less than 2 seconds. Neurological:      General: No focal deficit present. Mental Status: He is alert and oriented to person, place, and time. Mental status is at baseline. Psychiatric:         Mood and Affect: Mood normal.         Behavior: Behavior normal.         Thought Content: Thought content normal.          Procedures    Labs Reviewed   CBC WITH AUTO DIFFERENTIAL - Abnormal; Notable for the following components:       Result Value    MPV 12.5 (*)     Monocytes 13 (*)     All other components within normal limits   BASIC METABOLIC PANEL        CT SINUS WO CONTRAST    (Results Pending)            [place] Coma Scale  Eye Opening: Spontaneous  Best Verbal Response: Oriented  Best Motor Response: Obeys commands  Gilberto Coma Scale Score: 15                     Voice dictation software was used during the making of this note. This software is not perfect and grammatical and other typographical errors may be present. This note has not been completely proofread for errors.         Shahram Johnson DO  06/05/22 1047

## (undated) DEVICE — SHLDR ARTHO LEE: Brand: MEDLINE INDUSTRIES, INC.

## (undated) DEVICE — INTENDED FOR TISSUE SEPARATION, AND OTHER PROCEDURES THAT REQUIRE A SHARP SURGICAL BLADE TO PUNCTURE OR CUT.: Brand: BARD-PARKER ® STAINLESS STEEL BLADES

## (undated) DEVICE — DRAPE,SHOULDER,BEACH CHAIR,STERILE: Brand: MEDLINE

## (undated) DEVICE — MANIFOLD REPROC 4 PRT NEP 1

## (undated) DEVICE — SLING ARM AD ULT

## (undated) DEVICE — FIRSTPASS MINI STRAIGHT SUTURE PASSER: Brand: FIRSTPASS

## (undated) DEVICE — PREP SKN CHLRAPRP APL 26ML STR --

## (undated) DEVICE — STOCKINETTE,IMPERVIOUS,12X48,STERILE: Brand: MEDLINE

## (undated) DEVICE — RESTRAINT UNIVERSAL HEAD DISP --

## (undated) DEVICE — MASTISOL ADHESIVE LIQ 2/3ML

## (undated) DEVICE — SET IRRIG DST FLX M CONN

## (undated) DEVICE — NDL SPNE QNCKE 18GX3.5IN LF --

## (undated) DEVICE — GARMENT,MEDLINE,DVT,INT,CALF,MED, GEN2: Brand: MEDLINE

## (undated) DEVICE — BANDAGE COBAN 6 IN WND 6INX5YD FOAM

## (undated) DEVICE — STRIP,CLOSURE,WOUND,MEDI-STRIP,1/2X4: Brand: MEDLINE

## (undated) DEVICE — SOLUTION IRRIG 3000ML 0.9% SOD CHL FLX CONT 0797208] ICU MEDICAL INC]

## (undated) DEVICE — SUTURE MCRYL SZ 3-0 L27IN ABSRB UD L19MM PS-2 3/8 CIR PRIM Y427H

## (undated) DEVICE — [RESECTOR CUTTER, ARTHROSCOPIC SHAVER BLADE,  DO NOT RESTERILIZE,  DO NOT USE IF PACKAGE IS DAMAGED,  KEEP DRY,  KEEP AWAY FROM SUNLIGHT]: Brand: FORMULA

## (undated) DEVICE — SET IRRIG W 96IN TBNG 4 LN FLX BG

## (undated) DEVICE — STERILE POLYISOPRENE POWDER-FREE SURGICAL GLOVES: Brand: PROTEXIS

## (undated) DEVICE — 3.0 MM COVAC 50 INTEGRATED CABLE                                    WAND ICW: Brand: COBLATION

## (undated) DEVICE — NEEDLE HYPO 18GA L1.5IN PNK S STL HUB POLYPR SHLD REG BVL

## (undated) DEVICE — SYR 50ML LR LCK 1ML GRAD NSAF --

## (undated) DEVICE — SOFT SILICONE HYDROCELLULAR FOAM DRESSING WITH LOCK AWAY LAYER: Brand: ALLEVYN LIFE XL 21X21 CTN10

## (undated) DEVICE — [AGGRESSIVE 6-FLUTE BARREL BUR, ARTHROSCOPIC SHAVER BLADE,  DO NOT RESTERILIZE,  DO NOT USE IF PACKAGE IS DAMAGED,  KEEP DRY,  KEEP AWAY FROM SUNLIGHT]: Brand: FORMULA